# Patient Record
Sex: FEMALE | Race: WHITE | Employment: FULL TIME | URBAN - METROPOLITAN AREA
[De-identification: names, ages, dates, MRNs, and addresses within clinical notes are randomized per-mention and may not be internally consistent; named-entity substitution may affect disease eponyms.]

---

## 2021-05-18 ENCOUNTER — OFFICE VISIT (OUTPATIENT)
Dept: FAMILY MEDICINE CLINIC | Age: 41
End: 2021-05-18
Payer: COMMERCIAL

## 2021-05-18 VITALS
HEIGHT: 70 IN | OXYGEN SATURATION: 98 % | SYSTOLIC BLOOD PRESSURE: 118 MMHG | WEIGHT: 202 LBS | BODY MASS INDEX: 28.92 KG/M2 | HEART RATE: 80 BPM | DIASTOLIC BLOOD PRESSURE: 80 MMHG

## 2021-05-18 DIAGNOSIS — R63.5 WEIGHT GAIN: ICD-10-CM

## 2021-05-18 DIAGNOSIS — Z76.89 ENCOUNTER TO ESTABLISH CARE: Primary | ICD-10-CM

## 2021-05-18 DIAGNOSIS — F41.9 ANXIETY: ICD-10-CM

## 2021-05-18 LAB
A/G RATIO: 1.8 (ref 1.1–2.2)
ALBUMIN SERPL-MCNC: 4.3 G/DL (ref 3.4–5)
ALP BLD-CCNC: 40 U/L (ref 40–129)
ALT SERPL-CCNC: 11 U/L (ref 10–40)
ANION GAP SERPL CALCULATED.3IONS-SCNC: 8 MMOL/L (ref 3–16)
AST SERPL-CCNC: 15 U/L (ref 15–37)
BASOPHILS ABSOLUTE: 0 K/UL (ref 0–0.2)
BASOPHILS RELATIVE PERCENT: 0.6 %
BILIRUB SERPL-MCNC: 0.3 MG/DL (ref 0–1)
BUN BLDV-MCNC: 7 MG/DL (ref 7–20)
CALCIUM SERPL-MCNC: 9 MG/DL (ref 8.3–10.6)
CHLORIDE BLD-SCNC: 106 MMOL/L (ref 99–110)
CHOLESTEROL, TOTAL: 194 MG/DL (ref 0–199)
CO2: 25 MMOL/L (ref 21–32)
CREAT SERPL-MCNC: 0.6 MG/DL (ref 0.6–1.1)
EOSINOPHILS ABSOLUTE: 0.1 K/UL (ref 0–0.6)
EOSINOPHILS RELATIVE PERCENT: 1.6 %
GFR AFRICAN AMERICAN: >60
GFR NON-AFRICAN AMERICAN: >60
GLOBULIN: 2.4 G/DL
GLUCOSE BLD-MCNC: 77 MG/DL (ref 70–99)
HCT VFR BLD CALC: 38.8 % (ref 36–48)
HDLC SERPL-MCNC: 91 MG/DL (ref 40–60)
HEMOGLOBIN: 13 G/DL (ref 12–16)
LDL CHOLESTEROL CALCULATED: 89 MG/DL
LYMPHOCYTES ABSOLUTE: 1.3 K/UL (ref 1–5.1)
LYMPHOCYTES RELATIVE PERCENT: 27 %
MCH RBC QN AUTO: 32.6 PG (ref 26–34)
MCHC RBC AUTO-ENTMCNC: 33.4 G/DL (ref 31–36)
MCV RBC AUTO: 97.7 FL (ref 80–100)
MONOCYTES ABSOLUTE: 0.7 K/UL (ref 0–1.3)
MONOCYTES RELATIVE PERCENT: 15.7 %
NEUTROPHILS ABSOLUTE: 2.6 K/UL (ref 1.7–7.7)
NEUTROPHILS RELATIVE PERCENT: 55.1 %
PDW BLD-RTO: 12.8 % (ref 12.4–15.4)
PLATELET # BLD: 246 K/UL (ref 135–450)
PMV BLD AUTO: 9 FL (ref 5–10.5)
POTASSIUM SERPL-SCNC: 4.9 MMOL/L (ref 3.5–5.1)
RBC # BLD: 3.98 M/UL (ref 4–5.2)
SODIUM BLD-SCNC: 139 MMOL/L (ref 136–145)
TOTAL PROTEIN: 6.7 G/DL (ref 6.4–8.2)
TRIGL SERPL-MCNC: 69 MG/DL (ref 0–150)
TSH REFLEX FT4: 0.97 UIU/ML (ref 0.27–4.2)
VLDLC SERPL CALC-MCNC: 14 MG/DL
WBC # BLD: 4.7 K/UL (ref 4–11)

## 2021-05-18 PROCEDURE — 99204 OFFICE O/P NEW MOD 45 MIN: CPT | Performed by: NURSE PRACTITIONER

## 2021-05-18 RX ORDER — BUPROPION HYDROCHLORIDE 150 MG/1
150 TABLET ORAL EVERY MORNING
Qty: 30 TABLET | Refills: 3 | Status: SHIPPED | OUTPATIENT
Start: 2021-05-18 | End: 2022-01-06 | Stop reason: SDUPTHER

## 2021-05-18 ASSESSMENT — PATIENT HEALTH QUESTIONNAIRE - PHQ9: SUM OF ALL RESPONSES TO PHQ QUESTIONS 1-9: 0

## 2021-05-18 NOTE — ASSESSMENT & PLAN NOTE
Unclear control, lifestyle modifications recommended and We will follow-up with labs today, instructed patient to do a 2-week diet log for all caloric intake in addition to exercise logs. I suspect part of her issue may be she is not taking in enough protein, she is also having 12 to 15-hour fast in between eating.

## 2021-05-18 NOTE — ASSESSMENT & PLAN NOTE
Health maintenance reviewed and addressed.   Is not interested in Covid vaccine at this time Left voice message for patient stating orders were placed for physical therapy for the Left wrist. Message stated that a PSR will be calling to schedule that appointment and if she has any questions she should call the office at [phone].   Patient is post op ORIF Left Distal Radius, DOS: 5/10/17.

## 2021-05-18 NOTE — PROGRESS NOTES
Procedure Laterality Date    OVARY REMOVAL Right     WISDOM TOOTH EXTRACTION N/A        Social History     Socioeconomic History    Marital status: Single     Spouse name: Not on file    Number of children: Not on file    Years of education: Not on file    Highest education level: Not on file   Occupational History    Not on file   Tobacco Use    Smoking status: Former Smoker     Packs/day: 0.50     Types: Cigarettes     Start date: 2001     Quit date: 2007     Years since quittin.3    Smokeless tobacco: Never Used   Vaping Use    Vaping Use: Never assessed   Substance and Sexual Activity    Alcohol use: Not on file    Drug use: Not on file    Sexual activity: Yes     Partners: Male     Birth control/protection: I.U.D. Other Topics Concern    Not on file   Social History Narrative    [address]     Social Determinants of Health     Financial Resource Strain:     Difficulty of Paying Living Expenses:    Food Insecurity:     Worried About Running Out of Food in the Last Year:     [address] in the Last Year:    Transportation Needs:     Lack of Transportation (Medical):  Lack of Transportation (Non-Medical):    Physical Activity:     Days of Exercise per Week:     Minutes of Exercise per Session:    Stress:     Feeling of Stress :    Social Connections:     Frequency of Communication with Friends and Family:     Frequency of Social Gatherings with Friends and Family:     Attends Restoration Services:     Active Member of Clubs or Organizations:     Attends Club or Organization Meetings:     Marital Status:    Intimate Partner Violence:     Fear of Current or Ex-Partner:     Emotionally Abused:     Physically Abused:     Sexually Abused:         No family history on file.     Vitals:    21 1059   BP: 118/80   Pulse: 80   SpO2: 98%       Estimated body mass index is 28.98 kg/m² as calculated from the following:    Height as of this encounter: 5' 10\" (1.778 m). Weight as of this encounter: 202 lb (91.6 kg). Physical Exam  Vitals reviewed. Constitutional:       Appearance: She is well-developed. She is obese. HENT:      Head: Normocephalic and atraumatic. Right Ear: External ear normal.      Left Ear: External ear normal.      Nose: Nose normal.   Eyes:      General: No scleral icterus. Right eye: No discharge. Left eye: No discharge. Conjunctiva/sclera: Conjunctivae normal.      Pupils: Pupils are equal, round, and reactive to light. Neck:      Thyroid: No thyromegaly. Cardiovascular:      Rate and Rhythm: Normal rate and regular rhythm. Heart sounds: Normal heart sounds. No murmur heard. No friction rub. No gallop. Pulmonary:      Effort: Pulmonary effort is normal. No respiratory distress. Breath sounds: Normal breath sounds. No stridor. No wheezing or rales. Chest:      Chest wall: No tenderness. Abdominal:      General: Bowel sounds are normal. There is no distension. Palpations: Abdomen is soft. There is no mass. Tenderness: There is no abdominal tenderness. There is no guarding or rebound. Hernia: No hernia is present. Musculoskeletal:         General: No tenderness or deformity. Normal range of motion. Cervical back: Normal range of motion and neck supple. Lymphadenopathy:      Cervical: No cervical adenopathy. Skin:     General: Skin is warm and dry. Capillary Refill: Capillary refill takes less than 2 seconds. Coloration: Skin is not pale. Findings: No erythema or rash. Neurological:      Mental Status: She is alert and oriented to person, place, and time. Cranial Nerves: No cranial nerve deficit. Motor: No abnormal muscle tone. Coordination: Coordination normal.   Psychiatric:         Behavior: Behavior normal.         Thought Content:  Thought content normal.         Judgment: Judgment normal.         ASSESSMENT/PLAN:  Health Maintenance   Topic Date Due    COVID-19 Vaccine (1) Never done    DTaP/Tdap/Td vaccine (1 - Tdap) Never done    Lipid screen  Never done    Diabetes screen  Never done    Flu vaccine (Season Ended) 09/01/2021    Cervical cancer screen  05/18/2022    Hepatitis A vaccine  Aged Out    Hepatitis B vaccine  Aged Out    Hib vaccine  Aged Out    Meningococcal (ACWY) vaccine  Aged Out    Pneumococcal 0-64 years Vaccine  Aged Out    Varicella vaccine  Discontinued    Hepatitis C screen  Discontinued    HIV screen  Discontinued        Diagnosis Orders   1. Encounter to establish care     2. Weight gain  TSH WITH REFLEX TO FT4    COMPREHENSIVE METABOLIC PANEL    CBC WITH AUTO DIFFERENTIAL    LIPID PANEL    HEMOGLOBIN A1C   3. Anxiety  buPROPion (WELLBUTRIN XL) 150 MG extended release tablet     Anxiety  Restart Wellbutrin 150 XL. Follow-up with me in 1 month. Encounter to establish care  Health maintenance reviewed and addressed. Is not interested in Covid vaccine at this time    Weight gain   Unclear control, lifestyle modifications recommended and We will follow-up with labs today, instructed patient to do a 2-week diet log for all caloric intake in addition to exercise logs. I suspect part of her issue may be she is not taking in enough protein, she is also having 12 to 15-hour fast in between eating. No follow-ups on file. An  electronic signature was used to authenticate this note.   --Fausto Cruz, RAMAKRISHNA Santana CNP on 5/18/2021 at 12:15 PM

## 2021-05-19 LAB
ESTIMATED AVERAGE GLUCOSE: 93.9 MG/DL
HBA1C MFR BLD: 4.9 %

## 2021-06-09 ENCOUNTER — E-VISIT (OUTPATIENT)
Dept: FAMILY MEDICINE CLINIC | Age: 41
End: 2021-06-09
Payer: COMMERCIAL

## 2021-06-09 DIAGNOSIS — N30.00 ACUTE CYSTITIS WITHOUT HEMATURIA: Primary | ICD-10-CM

## 2021-06-09 PROCEDURE — 99422 OL DIG E/M SVC 11-20 MIN: CPT | Performed by: FAMILY MEDICINE

## 2021-06-10 RX ORDER — SULFAMETHOXAZOLE AND TRIMETHOPRIM 800; 160 MG/1; MG/1
1 TABLET ORAL 2 TIMES DAILY
Qty: 6 TABLET | Refills: 0 | Status: SHIPPED | OUTPATIENT
Start: 2021-06-10 | End: 2021-06-13

## 2021-06-10 NOTE — PROGRESS NOTES
The above-named patient has requested evaluation and management services through an electronic visit by way of [employer] powered by [employer] and provided by [facility]. The history of present illness provided by the patient as well as medications, allergies, past medical history have been reviewed in this electronic health record. Following evaluation and management recommendations have been made and communicated to the patient via Wander[employer]: Thank you for submitting an Evisit. I'm sorry you aren't feeling well. It sounds like you most likely have a urinary tract infection. I will send in a prescription for Bactrim DS to your pharmacy. Make sure you are drinking plenty of water and that you void after sexual intercourse. Taking a probiotic would also be helpful to prevent antibiotic-associated diarrhea. Contact your primary care physician's office if you do not see any improvement with the medication within the next 3 days. Please seek more urgent medical attention if you develop high fever, vomiting, or severe back/flank pain. Diagnoses and all orders for this visit:    Acute cystitis without hematuria  -     sulfamethoxazole-trimethoprim (BACTRIM DS) 800-160 MG per tablet; Take 1 tablet by mouth 2 times daily for 3 days        11-20 minutes were spent on the digital evaluation and management of this patient.

## 2022-01-05 ENCOUNTER — PATIENT MESSAGE (OUTPATIENT)
Dept: FAMILY MEDICINE CLINIC | Age: 42
End: 2022-01-05

## 2022-01-05 DIAGNOSIS — F41.9 ANXIETY: ICD-10-CM

## 2022-01-05 NOTE — TELEPHONE ENCOUNTER
From: [facility]  To: Dewane Lundborg  Sent: 1/5/2022 2:36 PM EST  Subject: Prescription refill    Hello. I am requesting a med refill for Bupropion. Thank you!   Felisha Guidry

## 2022-01-06 RX ORDER — BUPROPION HYDROCHLORIDE 150 MG/1
150 TABLET ORAL EVERY MORNING
Qty: 30 TABLET | Refills: 3 | Status: SHIPPED | OUTPATIENT
Start: 2022-01-06 | End: 2022-09-09 | Stop reason: SINTOL

## 2022-01-31 ENCOUNTER — OFFICE VISIT (OUTPATIENT)
Dept: ORTHOPEDIC SURGERY | Age: 42
End: 2022-01-31
Payer: COMMERCIAL

## 2022-01-31 VITALS — HEIGHT: 70 IN | WEIGHT: 191.3 LBS | BODY MASS INDEX: 27.39 KG/M2

## 2022-01-31 DIAGNOSIS — R07.81 RIB PAIN ON LEFT SIDE: ICD-10-CM

## 2022-01-31 DIAGNOSIS — S22.32XA CLOSED FRACTURE OF ONE RIB OF LEFT SIDE, INITIAL ENCOUNTER: Primary | ICD-10-CM

## 2022-01-31 DIAGNOSIS — R07.81 RIB PAIN ON LEFT SIDE: Primary | ICD-10-CM

## 2022-01-31 PROCEDURE — 99204 OFFICE O/P NEW MOD 45 MIN: CPT | Performed by: STUDENT IN AN ORGANIZED HEALTH CARE EDUCATION/TRAINING PROGRAM

## 2022-01-31 RX ORDER — HYDROCODONE BITARTRATE AND ACETAMINOPHEN 5; 325 MG/1; MG/1
1 TABLET ORAL 2 TIMES DAILY
Qty: 28 TABLET | Refills: 0 | Status: SHIPPED | OUTPATIENT
Start: 2022-01-31 | End: 2022-02-14

## 2022-01-31 NOTE — PROGRESS NOTES
CHIEF COMPLAINT: Left rib pain     DATE OF INJURY: 1/28/22    HISTORY:  Ms. Reece Cee is a 39 y.o. right handed female, who presents today for evaluation of a left rib injury. The patient reports that this injury occurred when when she fell skiing and landed with her arms above her head on her left side. She was first seen and evaluated in urgent care however Xray's were not taken. The patient denies any other injuries. She rates pain at a level of 8/10 on an analog scale. Movement makes the pain worse. Splint and resting makes the pain better. No numbness or tingling sensation. 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 Past Medical History:   Diagnosis Date    Anxiety associated with depression     Attention deficit hyperactivity disorder (ADHD), combined type     Thoracogenic scoliosis of thoracic region        Past Surgical History:   Procedure Laterality Date    OVARY REMOVAL Right     WISDOM TOOTH EXTRACTION N/A        Current Outpatient Medications   Medication Sig Dispense Refill    HYDROcodone-acetaminophen (NORCO) 5-325 MG per tablet Take 1 tablet by mouth 2 times daily for 14 days. 28 tablet 0    buPROPion (WELLBUTRIN XL) 150 MG extended release tablet Take 1 tablet by mouth every morning 30 tablet 3     No current facility-administered medications for this visit.        No Known Allergies    Social History     Socioeconomic History    Marital status: Single     Spouse name: Not on file    Number of children: Not on file    Years of education: Not on file    Highest education level: Not on file   Occupational History    Not on file   Tobacco Use    Smoking status: Former Smoker     Packs/day: 0.50     Types: Cigarettes     Start date: 1/1/2001     Quit date: 1/1/2007     Years since quitting: 15.0    Smokeless tobacco: Never Used   Vaping Use    Vaping Use: Not on file   Substance and Sexual Activity    Alcohol use: Not on file    Drug use: Not on file    Sexual activity: Yes     Partners: Male     Birth control/protection: I.U.D. Other Topics Concern    Not on file   Social History Narrative    [address]     Social Determinants of Health     Financial Resource Strain:     Difficulty of Paying Living Expenses: Not on file   Food Insecurity:     Worried About [address] in the Last Year: Not on file    An of Food in the Last Year: Not on file   Transportation Needs:     Lack of Transportation (Medical): Not on file    Lack of Transportation (Non-Medical): Not on file   Physical Activity:     Days of Exercise per Week: Not on file    Minutes of Exercise per Session: Not on file   Stress:     Feeling of Stress : Not on file   Social Connections:     Frequency of Communication with Friends and Family: Not on file    Frequency of Social Gatherings with Friends and Family: Not on file    Attends Taoist Services: Not on file    Active Member of [address] [place] [address] or Organizations: Not on file    Attends Club or Organization Meetings: Not on file    Marital Status: Not on file   Intimate Partner Violence:     Fear of Current or Ex-Partner: Not on file    Emotionally Abused: Not on file    Physically Abused: Not on file    Sexually Abused: Not on file   Housing Stability:     Unable to Pay for Housing in the Last Year: Not on file    Number of [place] in the Last Year: Not on file    Unstable Housing in the Last Year: Not on file       History reviewed. No pertinent family history. Review of Systems:   I have reviewed the clinically relevant past medical history, medications, allergies, family history, social history, and 13 point Review of Systems from the patient's recent history form & documented any details relevant to today's presenting complaints in the history above.  The patient's self-reported past medical history, medications, allergies, family history, social history, and Review of Systems form from 1/31/22 have been scanned into the chart under the \"Media\" tab. PHYSICAL EXAM:  Ms. Lisa Kendrick is a 39 y.o. female who presents today in no acute distress, awake, alert, and oriented. On evaluation of her left trunk and abdomen  there is no obvious deformity. There is no swelling and is no ecchymosis. She is tender to palpation over the 8th rib anterior laterally, and otherwise nontender over the remainder of the trunk or abdomen. Range of motion with reaching overhead left upper extremity is decreased secondary to pain over the left anterior rib cage. She has pain with deep breathing, coughing, bearing down, laughing, pushing and pulling. She is having trouble sleeping due to pain. The skin overlying the left truncal region is intact without evidence of lesion, laceration or abrasion. Distal pulses are 2+ and symmetric bilaterally. Sensation is grossly intact to light touch and symmetric bilaterally. Denies shortness of breath or flank pain. Left rib including chest Xrays 3 views taken today in the office: Possible fracture of lateral 8th rib. IMPRESSION:    Left rib fracture       PLAN:  Discussed natural history and answered questions. We discussed conservative treatment. Restrict pushing, pulling, carrying and lifting with left upper extremity. She does not need a work note. Norco 5-325 BID for 14 days sent to pharmacy. Low risk ORT and prescription monitoring reviewed. Follow up in 6 weeks if no improvement or sooner if worsening pain. Julissa Briggs PA-C  Orthopaedic Surgery and Sports Medicine     Disclaimer: This note was generated with use of a verbal recognition program and an attempt was made to check for errors. It is possible that there are still dictated errors within this office note. If so, please bring any significant errors to my attention for an addendum. All efforts were made to ensure that this office note is accurate.

## 2022-02-10 RX ORDER — GABAPENTIN 100 MG/1
100 CAPSULE ORAL 3 TIMES DAILY
Qty: 90 CAPSULE | Refills: 1 | Status: ON HOLD | OUTPATIENT
Start: 2022-02-10 | End: 2022-07-31 | Stop reason: HOSPADM

## 2022-02-10 RX ORDER — CYCLOBENZAPRINE HCL 10 MG
10 TABLET ORAL 3 TIMES DAILY PRN
Qty: 80 TABLET | Refills: 0 | Status: SHIPPED | OUTPATIENT
Start: 2022-02-10 | End: 2022-03-09

## 2022-07-30 ENCOUNTER — HOSPITAL ENCOUNTER (INPATIENT)
Age: 42
LOS: 1 days | Discharge: HOME | DRG: 310 | End: 2022-07-31
Attending: EMERGENCY MEDICINE | Admitting: INTERNAL MEDICINE
Payer: COMMERCIAL

## 2022-07-30 ENCOUNTER — APPOINTMENT (OUTPATIENT)
Dept: GENERAL RADIOLOGY | Age: 42
DRG: 310 | End: 2022-07-30
Payer: COMMERCIAL

## 2022-07-30 ENCOUNTER — APPOINTMENT (OUTPATIENT)
Dept: CT IMAGING | Age: 42
DRG: 310 | End: 2022-07-30
Payer: COMMERCIAL

## 2022-07-30 DIAGNOSIS — E83.42 HYPOMAGNESEMIA: ICD-10-CM

## 2022-07-30 DIAGNOSIS — I48.91 ATRIAL FIBRILLATION WITH RAPID VENTRICULAR RESPONSE (HCC): Primary | ICD-10-CM

## 2022-07-30 LAB
A/G RATIO: 1.4 (ref 1.1–2.2)
ALBUMIN SERPL-MCNC: 4.2 G/DL (ref 3.4–5)
ALP BLD-CCNC: 57 U/L (ref 40–129)
ALT SERPL-CCNC: 12 U/L (ref 10–40)
ANION GAP SERPL CALCULATED.3IONS-SCNC: 10 MMOL/L (ref 3–16)
APTT: 27.6 SEC (ref 23–34.3)
AST SERPL-CCNC: 16 U/L (ref 15–37)
BASOPHILS ABSOLUTE: 0.2 K/UL (ref 0–0.2)
BASOPHILS RELATIVE PERCENT: 1.9 %
BILIRUB SERPL-MCNC: <0.2 MG/DL (ref 0–1)
BUN BLDV-MCNC: 7 MG/DL (ref 7–20)
CALCIUM SERPL-MCNC: 9.4 MG/DL (ref 8.3–10.6)
CHLORIDE BLD-SCNC: 104 MMOL/L (ref 99–110)
CO2: 26 MMOL/L (ref 21–32)
CREAT SERPL-MCNC: 0.6 MG/DL (ref 0.6–1.1)
D DIMER: 1.88 UG/ML FEU (ref 0–0.6)
EOSINOPHILS ABSOLUTE: 0.2 K/UL (ref 0–0.6)
EOSINOPHILS RELATIVE PERCENT: 2.2 %
GFR AFRICAN AMERICAN: >60
GFR NON-AFRICAN AMERICAN: >60
GLUCOSE BLD-MCNC: 111 MG/DL (ref 70–99)
HCG QUALITATIVE: NEGATIVE
HCT VFR BLD CALC: 40 % (ref 36–48)
HEMOGLOBIN: 13.4 G/DL (ref 12–16)
INR BLD: 1 (ref 0.87–1.14)
LYMPHOCYTES ABSOLUTE: 2.7 K/UL (ref 1–5.1)
LYMPHOCYTES RELATIVE PERCENT: 34 %
MAGNESIUM: 1.6 MG/DL (ref 1.8–2.4)
MCH RBC QN AUTO: 32 PG (ref 26–34)
MCHC RBC AUTO-ENTMCNC: 33.4 G/DL (ref 31–36)
MCV RBC AUTO: 95.6 FL (ref 80–100)
MONOCYTES ABSOLUTE: 0.7 K/UL (ref 0–1.3)
MONOCYTES RELATIVE PERCENT: 8.7 %
NEUTROPHILS ABSOLUTE: 4.1 K/UL (ref 1.7–7.7)
NEUTROPHILS RELATIVE PERCENT: 53.2 %
PDW BLD-RTO: 11.9 % (ref 12.4–15.4)
PLATELET # BLD: 335 K/UL (ref 135–450)
PMV BLD AUTO: 8.4 FL (ref 5–10.5)
POTASSIUM SERPL-SCNC: 3.9 MMOL/L (ref 3.5–5.1)
PRO-BNP: 131 PG/ML (ref 0–124)
PROTHROMBIN TIME: 13.1 SEC (ref 11.7–14.5)
RBC # BLD: 4.19 M/UL (ref 4–5.2)
SODIUM BLD-SCNC: 140 MMOL/L (ref 136–145)
TOTAL PROTEIN: 7.1 G/DL (ref 6.4–8.2)
TROPONIN: <0.01 NG/ML
TSH REFLEX: 0.77 UIU/ML (ref 0.27–4.2)
WBC # BLD: 7.8 K/UL (ref 4–11)

## 2022-07-30 PROCEDURE — 84703 CHORIONIC GONADOTROPIN ASSAY: CPT

## 2022-07-30 PROCEDURE — 96372 THER/PROPH/DIAG INJ SC/IM: CPT

## 2022-07-30 PROCEDURE — 80053 COMPREHEN METABOLIC PANEL: CPT

## 2022-07-30 PROCEDURE — 96374 THER/PROPH/DIAG INJ IV PUSH: CPT

## 2022-07-30 PROCEDURE — 6360000002 HC RX W HCPCS: Performed by: EMERGENCY MEDICINE

## 2022-07-30 PROCEDURE — 83735 ASSAY OF MAGNESIUM: CPT

## 2022-07-30 PROCEDURE — 6360000004 HC RX CONTRAST MEDICATION: Performed by: EMERGENCY MEDICINE

## 2022-07-30 PROCEDURE — 71046 X-RAY EXAM CHEST 2 VIEWS: CPT

## 2022-07-30 PROCEDURE — 1200000000 HC SEMI PRIVATE

## 2022-07-30 PROCEDURE — 85610 PROTHROMBIN TIME: CPT

## 2022-07-30 PROCEDURE — 2500000003 HC RX 250 WO HCPCS

## 2022-07-30 PROCEDURE — 85730 THROMBOPLASTIN TIME PARTIAL: CPT

## 2022-07-30 PROCEDURE — 71260 CT THORAX DX C+: CPT | Performed by: EMERGENCY MEDICINE

## 2022-07-30 PROCEDURE — 2580000003 HC RX 258: Performed by: INTERNAL MEDICINE

## 2022-07-30 PROCEDURE — 93005 ELECTROCARDIOGRAM TRACING: CPT | Performed by: EMERGENCY MEDICINE

## 2022-07-30 PROCEDURE — 99285 EMERGENCY DEPT VISIT HI MDM: CPT

## 2022-07-30 PROCEDURE — 85025 COMPLETE CBC W/AUTO DIFF WBC: CPT

## 2022-07-30 PROCEDURE — 83880 ASSAY OF NATRIURETIC PEPTIDE: CPT

## 2022-07-30 PROCEDURE — 6370000000 HC RX 637 (ALT 250 FOR IP): Performed by: INTERNAL MEDICINE

## 2022-07-30 PROCEDURE — 85379 FIBRIN DEGRADATION QUANT: CPT

## 2022-07-30 PROCEDURE — 6360000002 HC RX W HCPCS: Performed by: INTERNAL MEDICINE

## 2022-07-30 PROCEDURE — 84443 ASSAY THYROID STIM HORMONE: CPT

## 2022-07-30 PROCEDURE — 84484 ASSAY OF TROPONIN QUANT: CPT

## 2022-07-30 PROCEDURE — 2500000003 HC RX 250 WO HCPCS: Performed by: EMERGENCY MEDICINE

## 2022-07-30 RX ORDER — BUPROPION HYDROCHLORIDE 150 MG/1
150 TABLET ORAL EVERY MORNING
Status: DISCONTINUED | OUTPATIENT
Start: 2022-07-31 | End: 2022-07-31 | Stop reason: HOSPADM

## 2022-07-30 RX ORDER — ONDANSETRON 4 MG/1
4 TABLET, ORALLY DISINTEGRATING ORAL EVERY 8 HOURS PRN
Status: DISCONTINUED | OUTPATIENT
Start: 2022-07-30 | End: 2022-07-31 | Stop reason: HOSPADM

## 2022-07-30 RX ORDER — DILTIAZEM HYDROCHLORIDE 5 MG/ML
20 INJECTION INTRAVENOUS ONCE
Status: COMPLETED | OUTPATIENT
Start: 2022-07-30 | End: 2022-07-30

## 2022-07-30 RX ORDER — ONDANSETRON 2 MG/ML
4 INJECTION INTRAMUSCULAR; INTRAVENOUS EVERY 6 HOURS PRN
Status: DISCONTINUED | OUTPATIENT
Start: 2022-07-30 | End: 2022-07-31 | Stop reason: HOSPADM

## 2022-07-30 RX ORDER — ACETAMINOPHEN 325 MG/1
650 TABLET ORAL EVERY 6 HOURS PRN
Status: DISCONTINUED | OUTPATIENT
Start: 2022-07-30 | End: 2022-07-31 | Stop reason: HOSPADM

## 2022-07-30 RX ORDER — MAGNESIUM SULFATE IN WATER 40 MG/ML
2000 INJECTION, SOLUTION INTRAVENOUS ONCE
Status: COMPLETED | OUTPATIENT
Start: 2022-07-30 | End: 2022-07-30

## 2022-07-30 RX ORDER — ENOXAPARIN SODIUM 100 MG/ML
40 INJECTION SUBCUTANEOUS DAILY
Status: DISCONTINUED | OUTPATIENT
Start: 2022-07-30 | End: 2022-07-31

## 2022-07-30 RX ORDER — GABAPENTIN 100 MG/1
100 CAPSULE ORAL 3 TIMES DAILY
Status: DISCONTINUED | OUTPATIENT
Start: 2022-07-30 | End: 2022-07-30

## 2022-07-30 RX ORDER — SODIUM CHLORIDE 0.9 % (FLUSH) 0.9 %
5-40 SYRINGE (ML) INJECTION EVERY 12 HOURS SCHEDULED
Status: DISCONTINUED | OUTPATIENT
Start: 2022-07-30 | End: 2022-07-31 | Stop reason: HOSPADM

## 2022-07-30 RX ORDER — SODIUM CHLORIDE 9 MG/ML
INJECTION, SOLUTION INTRAVENOUS PRN
Status: DISCONTINUED | OUTPATIENT
Start: 2022-07-30 | End: 2022-07-31 | Stop reason: HOSPADM

## 2022-07-30 RX ORDER — DILTIAZEM HYDROCHLORIDE 5 MG/ML
INJECTION INTRAVENOUS
Status: COMPLETED
Start: 2022-07-30 | End: 2022-07-30

## 2022-07-30 RX ORDER — SODIUM CHLORIDE 0.9 % (FLUSH) 0.9 %
5-40 SYRINGE (ML) INJECTION PRN
Status: DISCONTINUED | OUTPATIENT
Start: 2022-07-30 | End: 2022-07-31 | Stop reason: HOSPADM

## 2022-07-30 RX ORDER — POLYETHYLENE GLYCOL 3350 17 G/17G
17 POWDER, FOR SOLUTION ORAL DAILY PRN
Status: DISCONTINUED | OUTPATIENT
Start: 2022-07-30 | End: 2022-07-31 | Stop reason: HOSPADM

## 2022-07-30 RX ORDER — ACETAMINOPHEN 650 MG/1
650 SUPPOSITORY RECTAL EVERY 6 HOURS PRN
Status: DISCONTINUED | OUTPATIENT
Start: 2022-07-30 | End: 2022-07-31 | Stop reason: HOSPADM

## 2022-07-30 RX ADMIN — MAGNESIUM SULFATE HEPTAHYDRATE 2000 MG: 40 INJECTION, SOLUTION INTRAVENOUS at 18:11

## 2022-07-30 RX ADMIN — DILTIAZEM HYDROCHLORIDE 20 MG: 5 INJECTION INTRAVENOUS at 09:32

## 2022-07-30 RX ADMIN — ENOXAPARIN SODIUM 40 MG: 100 INJECTION SUBCUTANEOUS at 18:28

## 2022-07-30 RX ADMIN — DILTIAZEM HYDROCHLORIDE 30 MG: 30 TABLET, FILM COATED ORAL at 17:22

## 2022-07-30 RX ADMIN — IOPAMIDOL 75 ML: 755 INJECTION, SOLUTION INTRAVENOUS at 11:15

## 2022-07-30 RX ADMIN — DEXTROSE MONOHYDRATE 5 MG/HR: 50 INJECTION, SOLUTION INTRAVENOUS at 10:06

## 2022-07-30 RX ADMIN — SODIUM CHLORIDE, PRESERVATIVE FREE 10 ML: 5 INJECTION INTRAVENOUS at 21:26

## 2022-07-30 RX ADMIN — MAGNESIUM SULFATE HEPTAHYDRATE 2000 MG: 40 INJECTION, SOLUTION INTRAVENOUS at 10:32

## 2022-07-30 NOTE — H&P
Hospital Medicine History & Physical      PCP: RAMAKRISHNA Kim CNP    Date of Admission: 7/30/2022    Date of Service: Pt seen/examined on 7/30/2022    Chief Complaint:      Chief Complaint   Patient presents with    Tachycardia       History Of Present Illness:     80-year-old female with past medical history of depression presented to the hospital with palpitation going on for the past couple of days. Patient states that her heart has been beating fast for the past couple of days, it started without any inciting factors. She had such an episode couple weeks ago but it lasted only a few minutes. But for the past couple of days she feels like her heart has been racing nonstop. This has been accompanied with some exertional shortness of breath as well as dizziness. Since her symptoms were getting better she decided to come to the hospital.  On arrival she was noted to be in atrial fibrillation with rvr. Lab work showed mild hypomagnesemia 1.6. Patient had a CT PA performed which was negative for acute pulmonary embolism. Patient was admitted for further work-up and management. Past Medical History:        Diagnosis Date    Anxiety associated with depression     Attention deficit hyperactivity disorder (ADHD), combined type     Thoracogenic scoliosis of thoracic region        Past Surgical History:        Procedure Laterality Date    OVARY REMOVAL Right     WISDOM TOOTH EXTRACTION N/A        Medications Prior to Admission:    Prior to Admission medications    Medication Sig Start Date End Date Taking? Authorizing Provider   gabapentin (NEURONTIN) 100 MG capsule Take 1 capsule by mouth 3 times daily for 60 days. 2/10/22 4/11/22  Leslie Starks MD   buPROPion (WELLBUTRIN XL) 150 MG extended release tablet Take 1 tablet by mouth every morning 1/6/22   RAMAKRISHNA Llanes CNP       Allergies:  Patient has no known allergies.     Social History:       reports that she quit smoking about 140   K 3.9      CO2 26   BUN 7   CREATININE 0.6     LFT'S  Recent Labs     07/30/22  0924   AST 16   ALT 12   BILITOT <0.2   ALKPHOS 57     COAG  Recent Labs     07/30/22  0924   INR 1.00     CARDIAC ENZYMES  Recent Labs     07/30/22  0924   TROPONINI <0.01       U/A:  No results found for: NITRITE, COLORU, WBCUA, RBCUA, MUCUS, BACTERIA, CLARITYU, SPECGRAV, LEUKOCYTESUR, BLOODU, GLUCOSEU, AMORPHOUS    ABG  No results found for: TXK1IXS, BEART, Q1YCCCBM, PHART, THGBART, NXF2VZY, PO2ART, WIQ2NRW    UA:No results for input(s): NITRITE, COLORU, PHUR, LABCAST, WBCUA, RBCUA, MUCUS, TRICHOMONAS, YEAST, BACTERIA, CLARITYU, SPECGRAV, LEUKOCYTESUR, UROBILINOGEN, BILIRUBINUR, BLOODU, GLUCOSEU, KETUA, AMORPHOUS in the last 72 hours. Microbiology:  No results for input(s): LABGRAM, LABANAE, ORG, CXSURG in the last 72 hours. Nasal Culture: No results for input(s): ORG, MRSAPCR in the last 72 hours. Blood Culture: No results for input(s): BC, BLOODCULT2, ORG in the last 72 hours. Fungal Culture:   No results for input(s): FUNGSM in the last 72 hours. No results for input(s): FUNCXBLD in the last 72 hours. CSF Culture:  No results for input(s): COLORCSF, APPEARCSF, CFTUBE, CLOTCSF, WBCCSF, RBCCSF, NEUTCSF, NUMCELLSCSF, LYMPHSCSF, MONOCSF, GLUCCSF, VOLCSF in the last 72 hours. Respiratory Culture:  No results for input(s): Orpha Seeds in the last 72 hours. AFB:No results for input(s): AFBSMEAR in the last 72 hours. Urine Culture  No results for input(s): LABURIN in the last 72 hours. RADIOLOGY:    CT CHEST PULMONARY EMBOLISM W CONTRAST   Final Result      No evidence of pulmonary embolism or acute intrathoracic abnormality. Nonspecific small pulmonary nodules, likely benign. Following the Fleischner Society 2017 guidelines for multiple solid nodules <6 mm, if patient is low risk no routine follow-up is suggested. If patient is high risk, then optional CT at 12 months is    suggested.   qzxv Nonspecific asymmetric soft tissue density in the medial left breast, potentially normal fibroglandular tissue. Recommend correlation with mammography. INCIDENTAL FINDINGS: None. XR CHEST (2 VW)   Final Result      No acute radiographic abnormality of the chest.          Previous medical records personally reviewed and analyzed         PHYSICIAN CERTIFICATION    I certify that Rashaun Estrada is expected to be hospitalized for >2 midnights based on the following assessment and plan:    ASSESSMENT/PLAN:  There are no active hospital problems to display for this patient. Atrial fibrillation with RVR  -Patient endorses taking supplement called Hydroxycut which contains caffeine  - Continue IV Cardizem, will transition to oral Cardizem  - Cardiology consult  - Telemetry  - Keep K greater than 4, magnesium greater than 2  - Echo has been ordered    Depression  - Continue Wellbutrin    DVT Prophylaxis: lovenox  Diet: ADULT DIET; Regular  Code Status: Full Code    Dispo - pending clinical course       Josefa Easley MD  The note was completed using EMR. Every effort was made to ensure accuracy; however, inadvertent computerized transcription errors may be present. Thank you RAMAKRISHNA Palencia - JOE for the opportunity to be involved in this patient's care. If you have any questions or concerns please feel free to contact me at [phone].

## 2022-07-30 NOTE — PROGRESS NOTES
Just gave oral cardizem, waiting 30 minutes per Cardiology instructions to shut off drip. Mag sulfate and Dilt drip not compatible; 1 IV.  Waiting 30 minutes for mag replacement IVPB

## 2022-07-30 NOTE — PLAN OF CARE
Problem: Cardiovascular - Adult  Goal: Absence of cardiac dysrhythmias or at baseline  Outcome: Not Progressing     Problem: Cardiovascular - Adult  Goal: Maintains optimal cardiac output and hemodynamic stability  Outcome: Progressing     Pt remains in A-fib

## 2022-07-30 NOTE — ED PROVIDER NOTES
This patient was seen by Resident physician Dr. Rosanna Camilo NOTE  There was a high probability of clinical significant / life threatening deterioration in the patient's condition which required my urgent intervention. Total critical care time was at least 31  minutes excluding any separately reported procedures. I personally saw the patient and performed a substantive portion of the visit including all aspects of the medical decision making. History, physical exam, diagnostic tests, management/treatment, response to management/treatment, differential diagnosis and plan including follow-up were reviewed. EKG read by myself shows atrial fibrillation with rapid ventricular response and average rate of 134. Axis is 82. There is no ischemia. QTC is 400. CBC, CMP and troponin were normal.  D-dimer was elevated at 1.88.  proBNP was 131. Magnesium was low at 1.6. The patient was treated with Cardizem bolus and infusion with slowing of the rate down to approximately 100. She feels better. No chest pain or shortness of breath. IV magnesium was ordered. CTPA study read by the radiologist and reviewed by myself shows no evidence for PE or any acute intrathoracic abnormality. Discussed case with the hospitalist on duty, Dr. Betsy Wolf who [facility] [address] We will continue Cardizem infusion.       DX: 1) new onset atrial fibrillation with RVR         2) hypomagnesemia     Alli Dye MD  07/30/22 [id number]

## 2022-07-30 NOTE — ED PROVIDER NOTES
Emergency Physician Note    Chief Complaint  Tachycardia       History of Present Illness  Robert Drake is a 39 y.o. female who presents to the ED for palpitations that started shortly after waking today. Notes she had a similar episode 1 week ago, and two days ago but these quickly resolved without intervention. However, today PT  has palpitations, fast heart rate, shortness of breath and some light headedness. She was preparing to drive to [place], but when she got in her car, did not feel safe to drive so came to the ED instead. +FH (paternal MI at 47)      Denies nausea, personal history of cardiac issues, recent travel, calf pain or swelling     10 systems reviewed, pertinent positives per HPI otherwise noted to be negative      Nursing notes reviewed. ED Triage Vitals [07/30/22 0918]   Enc Vitals Group      BP (!) 146/58      Heart Rate (!) 124      Resp 14      Temp 97.8 °F (36.6 °C)      Temp Source Oral      SpO2 100 %      Weight 179 lb 12.8 oz (81.6 kg)      Height 5' 10\" (1.778 m)      Head Circumference       Peak Flow       Pain Score       Pain Loc       Pain Edu? Excl. in [address]? GENERAL:  Awake, alert. Well developed, well nourished with no apparent distress. HENT:  Normocephalic, Atraumatic, moist mucous membranes. EYES:  Pupils equal round and reactive to light, Conjunctiva normal, extraocular movements normal.  NECK:  No meningeal signs, Supple. CHEST:  Irregularly irregular with tachycardia, no extra heart sounds   LUNGS:  Clear to auscultation bilaterally. ABDOMEN:  Soft, non-tender, no rebound, rigidity or guarding, non-distended, normal bowel sounds. No costovertebral angle tenderness to palpation. BACK:  No tenderness. EXTREMITIES:  Normal range of motion, no edema, no bony tenderness, no deformity, distal pulses present. SKIN: Warm, dry and intact. NEUROLOGIC: Normal mental status. Moving all extremities to command.        LABS and DIAGNOSTIC RESULTS  EKG  The Ekg interpreted by me shows  Atrial fibrillation with RVR. Rate 134  Axis is   Normal  QTc is  normal  Intervals and Durations are unremarkable. ST Segments: nonspecific changes  Delta waves, Brugada Syndrome, and Short OR are not present. RADIOLOGY  X-RAYS:  I have reviewed radiologic plain film image(s). ALL OTHER NON-PLAIN FILM IMAGES SUCH AS CT, ULTRASOUND AND MRI HAVE BEEN READ BY THE RADIOLOGIST. CT CHEST PULMONARY EMBOLISM W CONTRAST   Final Result      No evidence of pulmonary embolism or acute intrathoracic abnormality. Nonspecific small pulmonary nodules, likely benign. Following the Fleischner Society 2017 guidelines for multiple solid nodules <6 mm, if patient is low risk no routine follow-up is suggested. If patient is high risk, then optional CT at 12 months is    suggested. qzxv      Nonspecific asymmetric soft tissue density in the medial left breast, potentially normal fibroglandular tissue. Recommend correlation with mammography. INCIDENTAL FINDINGS: None.       XR CHEST (2 VW)   Final Result      No acute radiographic abnormality of the chest.           LABS  Labs Reviewed   CBC WITH AUTO DIFFERENTIAL - Abnormal; Notable for the following components:       Result Value    RDW 11.9 (*)     All other components within normal limits   COMPREHENSIVE METABOLIC PANEL - Abnormal; Notable for the following components:    Glucose 111 (*)     All other components within normal limits   D-DIMER, QUANTITATIVE - Abnormal; Notable for the following components:    D-Dimer, Quant 1.88 (*)     All other components within normal limits   BRAIN NATRIURETIC PEPTIDE - Abnormal; Notable for the following components:    Pro- (*)     All other components within normal limits   MAGNESIUM - Abnormal; Notable for the following components:    Magnesium 1.60 (*)     All other components within normal limits   TROPONIN   PROTIME-INR   APTT   HCG, SERUM, QUALITATIVE   TSH WITH REFLEX       PROCEDURES      MEDICAL DECISION MAKING          The total Critical Care time is 31 minutes which excludes separately billable procedures. The critical care was concerning A Fib with RVR medical management and consult with Hospitalist on duty at [facility] where PT was admitted to Med/Surg with Tele for further observation . This time is exclusive of any time documented by any other providers. Patient was given scripts for the following medications. I counseled patient how to take these medications. New Prescriptions    No medications on file       Disposition  Pt is in stable condition upon Admit to med/surg floor with Telemetry. Clinical Impression  1. Atrial fibrillation with rapid ventricular response ([address]    2.  Hypomagnesemia        Dr. Dre Rucker, PGY3    Patient seen in collaboration with attending physician Dr. Janeen Morrow, DO  Resident  07/30/22 [phone]

## 2022-07-31 VITALS
HEIGHT: 70 IN | BODY MASS INDEX: 25 KG/M2 | SYSTOLIC BLOOD PRESSURE: 127 MMHG | WEIGHT: 174.6 LBS | OXYGEN SATURATION: 99 % | TEMPERATURE: 98.1 F | HEART RATE: 90 BPM | DIASTOLIC BLOOD PRESSURE: 71 MMHG | RESPIRATION RATE: 18 BRPM

## 2022-07-31 PROBLEM — I48.91 ATRIAL FIBRILLATION WITH RVR (HCC): Status: ACTIVE | Noted: 2022-07-31

## 2022-07-31 LAB
ANION GAP SERPL CALCULATED.3IONS-SCNC: 8 MMOL/L (ref 3–16)
BASOPHILS ABSOLUTE: 0 K/UL (ref 0–0.2)
BASOPHILS RELATIVE PERCENT: 0.4 %
BUN BLDV-MCNC: 8 MG/DL (ref 7–20)
CALCIUM SERPL-MCNC: 8.6 MG/DL (ref 8.3–10.6)
CHLORIDE BLD-SCNC: 103 MMOL/L (ref 99–110)
CO2: 26 MMOL/L (ref 21–32)
CREAT SERPL-MCNC: 0.7 MG/DL (ref 0.6–1.1)
EKG ATRIAL RATE: 170 BPM
EKG DIAGNOSIS: NORMAL
EKG DIAGNOSIS: NORMAL
EKG P AXIS: 225 DEGREES
EKG P-R INTERVAL: 120 MS
EKG Q-T INTERVAL: 268 MS
EKG Q-T INTERVAL: 316 MS
EKG QRS DURATION: 86 MS
EKG QRS DURATION: 88 MS
EKG QTC CALCULATION (BAZETT): 400 MS
EKG QTC CALCULATION (BAZETT): 437 MS
EKG R AXIS: 81 DEGREES
EKG R AXIS: 82 DEGREES
EKG T AXIS: 13 DEGREES
EKG T AXIS: 53 DEGREES
EKG VENTRICULAR RATE: 115 BPM
EKG VENTRICULAR RATE: 134 BPM
EOSINOPHILS ABSOLUTE: 0.1 K/UL (ref 0–0.6)
EOSINOPHILS RELATIVE PERCENT: 2.8 %
GFR AFRICAN AMERICAN: >60
GFR NON-AFRICAN AMERICAN: >60
GLUCOSE BLD-MCNC: 88 MG/DL (ref 70–99)
HCT VFR BLD CALC: 39.7 % (ref 36–48)
HEMOGLOBIN: 13 G/DL (ref 12–16)
LYMPHOCYTES ABSOLUTE: 1.9 K/UL (ref 1–5.1)
LYMPHOCYTES RELATIVE PERCENT: 38.9 %
MCH RBC QN AUTO: 31.8 PG (ref 26–34)
MCHC RBC AUTO-ENTMCNC: 32.8 G/DL (ref 31–36)
MCV RBC AUTO: 97.1 FL (ref 80–100)
MONOCYTES ABSOLUTE: 0.5 K/UL (ref 0–1.3)
MONOCYTES RELATIVE PERCENT: 10.4 %
NEUTROPHILS ABSOLUTE: 2.3 K/UL (ref 1.7–7.7)
NEUTROPHILS RELATIVE PERCENT: 47.5 %
PDW BLD-RTO: 12.4 % (ref 12.4–15.4)
PLATELET # BLD: 289 K/UL (ref 135–450)
PMV BLD AUTO: 8.3 FL (ref 5–10.5)
POTASSIUM REFLEX MAGNESIUM: 4 MMOL/L (ref 3.5–5.1)
RBC # BLD: 4.09 M/UL (ref 4–5.2)
SODIUM BLD-SCNC: 137 MMOL/L (ref 136–145)
WBC # BLD: 4.9 K/UL (ref 4–11)

## 2022-07-31 PROCEDURE — 6360000002 HC RX W HCPCS: Performed by: INTERNAL MEDICINE

## 2022-07-31 PROCEDURE — 80048 BASIC METABOLIC PNL TOTAL CA: CPT

## 2022-07-31 PROCEDURE — 6370000000 HC RX 637 (ALT 250 FOR IP): Performed by: INTERNAL MEDICINE

## 2022-07-31 PROCEDURE — 85025 COMPLETE CBC W/AUTO DIFF WBC: CPT

## 2022-07-31 PROCEDURE — 99222 1ST HOSP IP/OBS MODERATE 55: CPT | Performed by: INTERNAL MEDICINE

## 2022-07-31 PROCEDURE — 2580000003 HC RX 258: Performed by: INTERNAL MEDICINE

## 2022-07-31 PROCEDURE — 93010 ELECTROCARDIOGRAM REPORT: CPT | Performed by: INTERNAL MEDICINE

## 2022-07-31 PROCEDURE — 36415 COLL VENOUS BLD VENIPUNCTURE: CPT

## 2022-07-31 RX ORDER — DILTIAZEM HYDROCHLORIDE 120 MG/1
120 CAPSULE, COATED, EXTENDED RELEASE ORAL DAILY
Status: DISCONTINUED | OUTPATIENT
Start: 2022-07-31 | End: 2022-07-31 | Stop reason: HOSPADM

## 2022-07-31 RX ORDER — DILTIAZEM HYDROCHLORIDE 120 MG/1
120 CAPSULE, COATED, EXTENDED RELEASE ORAL DAILY
Qty: 30 CAPSULE | Refills: 3 | Status: SHIPPED | OUTPATIENT
Start: 2022-07-31

## 2022-07-31 RX ADMIN — RIVAROXABAN 20 MG: 20 TABLET, FILM COATED ORAL at 15:43

## 2022-07-31 RX ADMIN — SODIUM CHLORIDE, PRESERVATIVE FREE 10 ML: 5 INJECTION INTRAVENOUS at 08:00

## 2022-07-31 RX ADMIN — DILTIAZEM HYDROCHLORIDE 30 MG: 30 TABLET, FILM COATED ORAL at 00:08

## 2022-07-31 RX ADMIN — DILTIAZEM HYDROCHLORIDE 30 MG: 30 TABLET, FILM COATED ORAL at 06:14

## 2022-07-31 RX ADMIN — ENOXAPARIN SODIUM 40 MG: 100 INJECTION SUBCUTANEOUS at 09:28

## 2022-07-31 RX ADMIN — BUPROPION HYDROCHLORIDE 150 MG: 150 TABLET, FILM COATED, EXTENDED RELEASE ORAL at 09:28

## 2022-07-31 RX ADMIN — DILTIAZEM HYDROCHLORIDE 120 MG: 120 CAPSULE, COATED, EXTENDED RELEASE ORAL at 13:43

## 2022-07-31 RX ADMIN — ACETAMINOPHEN 650 MG: 325 TABLET ORAL at 09:28

## 2022-07-31 ASSESSMENT — PAIN DESCRIPTION - DESCRIPTORS: DESCRIPTORS: ACHING

## 2022-07-31 ASSESSMENT — PAIN DESCRIPTION - FREQUENCY: FREQUENCY: INTERMITTENT

## 2022-07-31 ASSESSMENT — PAIN SCALES - GENERAL: PAINLEVEL_OUTOF10: 3

## 2022-07-31 ASSESSMENT — PAIN DESCRIPTION - ORIENTATION: ORIENTATION: MID

## 2022-07-31 ASSESSMENT — PAIN DESCRIPTION - ONSET: ONSET: GRADUAL

## 2022-07-31 ASSESSMENT — PAIN DESCRIPTION - LOCATION: LOCATION: HEAD

## 2022-07-31 NOTE — CONSULTS
Cardiology Consultation                                                                    Pt Name: Melia Lisa  Age: 39 y.o. Sex: female  : 1980  Location: [facility]/2156-99    Referring Physician: Sampson Braswell MD  Primary cardiologist: niko Ramirez      Reason for Consult:     Reason for Consultation/Chief Complaint: AFRVR    HPI:      Melia Lisa is a 39 y.o. female with a past medical history of depression and ADHD. Patient presented to the emergency room on  with palpitations, dyspnea and dizziness x2 days. She was found to have AF RVR. Her magnesium was also low at 1.6. CTPA was unremarkable. TSH normal.  ECG consistent with AF  bpm, no ischemic changes. Patient reports no previous similar symptoms. She admits to taking Wellbutrin for her depression and ADHD. She also takes Hydroxycut over-the-counter which contains a significant amount of caffeine. Additionally she admits to caffeinated drinks and alcoholic beverages 3-5 drinks about 3 times per week. Patient was given diltiazem 20 mg IV x1 and started on diltiazem drip. Early a.m. diltiazem drip was changed to diltiazem 30 mg p.o. every 6 hours. Telemetry now shows AF with controlled ventricular response in the 80s. Patient reports no symptoms. Histories     Past Medical History:   has a past medical history of Anxiety associated with depression, Attention deficit hyperactivity disorder (ADHD), combined type, and Thoracogenic scoliosis of thoracic region. Surgical History:   has a past surgical history that includes Ovary removal (Right) and [place] tooth extraction (N/A). Social History:   reports that she quit smoking about 15 years ago. Her smoking use included cigarettes. She started smoking about 21 years ago. She smoked an average of .5 packs per day. She has never used smokeless tobacco. She reports current alcohol use. Drug: Marijuana [place]).      Family History:  No evidence for sudden cardiac death or premature CAD      Medications:       Home Medications  Were reviewed and are listed in nursing record. and/or listed below  Prior to Admission medications    Medication Sig Start Date End Date Taking? Authorizing Provider   NONFORMULARY hydroxycut   Yes Historical Provider, MD   gabapentin (NEURONTIN) 100 MG capsule Take 1 capsule by mouth 3 times daily for 60 days. 2/10/22 4/11/22  Hamzah Lazaro MD   buPROPion (WELLBUTRIN XL) 150 MG extended release tablet Take 1 tablet by mouth every morning 1/6/22   Kylah Jones, APRN - CNP          Inpatient Medications:   [START ON 8/1/2022] rivaroxaban  20 mg Oral Daily    dilTIAZem  120 mg Oral Daily    buPROPion  150 mg Oral QAM    sodium chloride flush  5-40 mL IntraVENous 2 times per day       IV drips:   sodium chloride         PRN:  sodium chloride flush, sodium chloride, ondansetron **OR** ondansetron, polyethylene glycol, acetaminophen **OR** acetaminophen, perflutren lipid microspheres    Allergy:     Patient has no known allergies. Review of Systems:     All 12 point review of symptoms completed. Pertinent positives identified in the HPI, all other review of symptoms negative as below. CONSTITUTIONAL: No fatigue  SKIN: No rash or pruritis. EYES: No visual changes or diplopia. No scleral icterus. ENT: No Headaches, hearing loss or vertigo. No mouth sores or sore throat. CARDIOVASCULAR: No chest pain/chest pressure/chest discomfort. +  palpitations. No edema. RESPIRATORY: +  dyspnea. No cough or wheezing, no sputum production. GASTROINTESTINAL: No N/V/D. No abdominal pain, appetite loss, blood in stools. GENITOURINARY: No dysuria, trouble voiding, or hematuria. MUSCULOSKELETAL:  No gait disturbance, weakness or joint complaints. NEUROLOGICAL: No headache, diplopia, change in muscle strength, numbness or tingling. No change in gait, balance, coordination, mood, affect, memory, mentation, behavior.   ENDOCRINE: No excessive thirst, fluid intake, or urination. No tremor. HEMATOLOGIC: No abnormal bruising or bleeding. ALLERGY: No nasal congestion or hives. Physical Examination:     Vitals:    07/31/22 0443 07/31/22 0600 07/31/22 0830 07/31/22 1233   BP: 111/70  107/73 128/69   Pulse: 90 90 91 92   Resp: 20 20 18   Temp: 97.6 °F (36.4 °C)  97.3 °F (36.3 °C) 97.7 °F (36.5 °C)   TempSrc: Oral  Axillary Axillary   SpO2: 97%  97% 99%   Weight: 174 lb 9.7 oz (79.2 kg)      Height:           Wt Readings from Last 3 Encounters:   07/31/22 174 lb 9.7 oz (79.2 kg)   01/31/22 191 lb 4.8 oz (86.8 kg)   05/18/21 202 lb (91.6 kg)         General Appearance:  Alert, cooperative, no distress, appears stated age Appropriate weight   Head:  Normocephalic, without obvious abnormality, atraumatic   Eyes:  PERRL, conjunctiva/corneas clear EOM intact  Ears normal   Throat no lesions       Nose: Nares normal, no drainage or sinus tenderness   Throat: Lips, mucosa, and tongue normal   Neck: Supple, symmetrical, trachea midline, no adenopathy, thyroid: not enlarged, symmetric, no tenderness/mass/nodules, no carotid bruit. Lungs:   Respirations unlabored, clear to auscultation bilaterally, without any wheezes, rubs or ronchi. Chest Wall:  No tenderness or deformity   Heart:  Irregular rhythm, rate is controlled, S1, S2 normal, there is no murmur, there is no rub or gallop, cannot assess jvd, no bilateral lower extremity edema   Abdomen:   Soft, non-tender, bowel sounds active all four quadrants,  no masses, no organomegaly       Extremities: Extremities normal, atraumatic, no cyanosis.     Pulses: 2+ and symmetric   Skin: Skin color, texture, turgor normal, no rashes or lesions   Pysch: Normal mood and affect   Neurologic: Normal gross motor and sensory exam.  Cranial nerves intact        Labs:     Recent Labs     07/30/22  0924 07/31/22  0540    137   K 3.9 4.0   BUN 7 8   CREATININE 0.6 0.7    103   CO2 26 26   GLUCOSE 111* 88   CALCIUM 9.4 8.6 MG 1.60*  --      Recent Labs     07/30/22  0924 07/31/22  0540   WBC 7.8 4.9   HGB 13.4 13.0   HCT 40.0 39.7    289   MCV 95.6 97.1     No results for input(s): CHOLTOT, TRIG, HDL, CHOLHDL, LDL in the last 72 hours. Invalid input(s): [facility],[address], [address]  Recent Labs     07/30/22 0924   INR 1.00     Recent Labs     07/30/22 0924   TROPONINI <0.01     No results for input(s): BNP in the last 72 hours. No results for input(s): TSH in the last 72 hours. No results for input(s): CHOL, HDL, LDLCALC, TRIG in the last 72 hours.]    Lab Results   Component Value Date    TROPONINI <0.01 07/30/2022         Telemetry:     Telemetry personally reviewed:  AFCVR    Assessment / Plan:     1. PAF with RVR. It is most likely due to combination of caffeinated drinks, Hydroxycut, alcohol, Wellbutrin. Rate is now controlled on diltiazem. 2.  ADHD and depression on Wellbutrin  3. Hypomagnesemia. Most likely due to alcohol abuse. - Will change diltiazem short acting to diltiazem CD1 120 mg p.o. daily to prevent recurrence of A. fib RVR. - We will start Xarelto 20 mg p.o. daily  - I strongly advised patient to reduce the amount of caffeine she consumes and stop Hydroxycut, reduce alcohol intake. - Patient may be released home today and follow-up in my clinic within a couple of weeks. At that time, assuming patient has complied with my instructions regarding alcohol and caffeine, will repeat an ECG. There is a possibility patient may convert into sinus rhythm with the above treatment plan. Otherwise at that time we will schedule a DC cardioversion after 4 weeks of uninterrupted anticoagulation.  - No further cardiac testing is necessary at this time (echo can be done as an outpatient). Patient may be discharged home today on the above meds and follow up with me in 2 weeks. Please call me with any questions.                        I have personally reviewed the reports and images of labs, radiological studies, cardiac studies including ECG's and telemetry, current and old medical records. The note was completed using EMR and Dragon dictation system. Every effort was made to ensure accuracy; however, inadvertent computerized transcription errors may be present. All questions and concerns were addressed to the patient/family. Alternatives to my treatment were discussed. I would like to thank you for providing me the opportunity to participate in the care of your patient. If you have any questions, please do not hesitate to contact me.      Travon Murray MD, [facility], [address]  The [address]  [address]  [address] [place] [address]  Ph: [phone]  Fax: [phone]

## 2022-07-31 NOTE — PROGRESS NOTES
Hospitalist Progress Note      PCP: Jatinder Sorto APRN - CNP    Date of Admission: 7/30/2022    Chief Complaint: Palpitations    Hospital Course: 59-year-old female with past medical history of depression presented to the hospital with palpitation going on for the past couple of days. Subjective: No acute events ported overnight. Patient states that she feels better. Had intermittent palpitations throughout the day yesterday. Denies any chest pain, shortness of breath. Medications:  Reviewed    Infusion Medications    sodium chloride       Scheduled Medications    buPROPion  150 mg Oral QAM    sodium chloride flush  5-40 mL IntraVENous 2 times per day    enoxaparin  40 mg SubCUTAneous Daily    dilTIAZem  30 mg Oral 4 times per day     PRN Meds: sodium chloride flush, sodium chloride, ondansetron **OR** ondansetron, polyethylene glycol, acetaminophen **OR** acetaminophen, perflutren lipid microspheres      Intake/Output Summary (Last 24 hours) at 7/31/2022 0930  Last data filed at 7/31/2022 0600  Gross per 24 hour   Intake 490 ml   Output 0 ml   Net 490 ml       Physical Exam Performed:    /73   Pulse 91   Temp 97.3 °F (36.3 °C) (Axillary)   Resp 20   Ht 5' 10\" (1.778 m)   Wt 174 lb 9.7 oz (79.2 kg)   SpO2 97%   BMI 25.05 kg/m²     General appearance: No apparent distress, appears stated age and cooperative. HEENT: Pupils equal, round, and reactive to light. Conjunctivae/corneas clear. Neck: Supple, with full range of motion. No jugular venous distention. Trachea midline. Respiratory:  Normal respiratory effort. Clear to auscultation, bilaterally without Rales/Wheezes/Rhonchi. Cardiovascular: Regular rate and rhythm with normal S1/S2 without murmurs, rubs or gallops. Abdomen: Soft, non-tender, non-distended with normal bowel sounds. Musculoskeletal: No clubbing, cyanosis or edema bilaterally. Full range of motion without deformity.   Skin: Skin color, texture, turgor normal. No rashes or lesions. Neurologic:  Neurovascularly intact without any focal sensory/motor deficits. Cranial nerves: II-XII intact, grossly non-focal.  Psychiatric: Alert and oriented, thought content appropriate, normal insight  Capillary Refill: Brisk,3 seconds, normal   Peripheral Pulses: +2 palpable, equal bilaterally       Labs:   Recent Labs     07/30/22  0924 07/31/22  0540   WBC 7.8 4.9   HGB 13.4 13.0   HCT 40.0 39.7    289     Recent Labs     07/30/22  0924 07/31/22  0540    137   K 3.9 4.0    103   CO2 26 26   BUN 7 8   CREATININE 0.6 0.7   CALCIUM 9.4 8.6     Recent Labs     07/30/22  0924   AST 16   ALT 12   BILITOT <0.2   ALKPHOS 57     Recent Labs     07/30/22  0924   INR 1.00     Recent Labs     07/30/22  0924   TROPONINI <0.01       Urinalysis:    No results found for: [facility], BACTERIA, RBCUA, BLOODU, SPECGRAV, GLUCOSEU    Radiology:  CT CHEST PULMONARY EMBOLISM W CONTRAST   Final Result      No evidence of pulmonary embolism or acute intrathoracic abnormality. Nonspecific small pulmonary nodules, likely benign. Following the Fleischner Society 2017 guidelines for multiple solid nodules <6 mm, if patient is low risk no routine follow-up is suggested. If patient is high risk, then optional CT at 12 months is    suggested. qzxv      Nonspecific asymmetric soft tissue density in the medial left breast, potentially normal fibroglandular tissue. Recommend correlation with mammography. INCIDENTAL FINDINGS: None. XR CHEST (2 VW)   Final Result      No acute radiographic abnormality of the chest.              Assessment/Plan:    Atrial fibrillation with RVR  -Patient endorses taking supplement called Hydroxycut which contains caffeine  - Continue Cardizem, echo pending  - Cardiology consult  - Telemetry  - Keep K greater than 4, magnesium greater than 2     Depression  - Continue Wellbutrin    DVT Prophylaxis: Lovenox  Diet: ADULT DIET;  Regular  Code Status: Full Code    PT/OT Eval Status: n/a    Dispo -pending cardiology evaluation    Cas Gomez, MD

## 2022-07-31 NOTE — PLAN OF CARE
Problem: Cardiovascular - Adult  Goal: Maintains optimal cardiac output and hemodynamic stability  7/31/2022 0659 by Zhou Andrade RN  Outcome: Progressing  Flowsheets (Taken 7/31/2022 1028)  Maintains optimal cardiac output and hemodynamic stability: Monitor blood pressure and heart rate  Note: Pt's HR 80s-90s. A. Fib on telemetry. Pt receiving PO diltiazem. VSS. Will continue to monitor.

## 2022-07-31 NOTE — CARE COORDINATION
Case Management Assessment            Discharge Note                    Date / Time of Note: 7/31/2022 1:42 PM                  Discharge Note Completed by: Marybeth Miramontes RN    Patient Name: Jamey Saini   YOB: 1980  Diagnosis: Hypomagnesemia [E83.42]  Atrial fibrillation with rapid ventricular response ([address] [I48.91]  Atrial fibrillation with RVR ([address] [I48.91]   Date / Time: 7/30/2022  9:05 AM    Current PCP: RAMAKRISHNA Caldwell - CNP  Clinic patient: No    Hospitalization in the last 30 days: No    Advance Directives:  Code Status: Full Code  [place] DNR form completed and on chart: Not Indicated    Financial:  Payor: Angelica Coronause / Plan: Ortiz Brower [address] / Product Type: *No Product type* /      Pharmacy:    [place] [address] [phone] - F [phone]  [address] [place] [address] To [facility] [address]  Phone: [phone] Fax: [phone]    [facility], [address] [facility] [address] [phone] - F [phone]  [address] [place] [address]  Phone: [phone] Fax: [phone]      Assistance purchasing medications?: Potential Assistance Purchasing Medications: No  Assistance provided by Case Management: None at this time    Does patient want to participate in local refill/ meds to beds program?:      Meds To Beds General Rules:  1. Can ONLY be done Monday- Friday between 8:30am-5pm  2. Prescription(s) must be in pharmacy by 3pm to be filled same day  3. Copy of patient's insurance/ prescription drug card and patient face sheet must be sent along with the prescription(s)  4. Cost of Rx cannot be added to hospital bill. If financial assistance is needed, please contact unit  or ;  or  CANNOT provide pharmacy voucher for patients co-pays  5.  Patients can then  the prescription on their way out of the hospital at discharge, or pharmacy can deliver to the bedside if staff is available. (payment due at time of pick-up or delivery - cash, check, or card accepted)     Able to afford home medications/ co-pay costs: Yes    ADLS:  Current PT AM-PAC Score:   /24  Current OT AM-PAC Score:   /24      DISCHARGE Disposition: Home- No Services Needed    LOC at discharge: Not Applicable  GENESIS Completed: Not Indicated    Notification completed in HENS/PAS?:  Not Applicable    IMM Completed:   Not Indicated    Transportation:  Transportation PLAN for discharge: family   Mode of Transport: [address] ordered at discharge: Not [address]: Not Applicable  Orders faxed: No    Durable Medical Equipment:  DME Provider: none  Equipment obtained during hospitalization:     Home Oxygen and Respiratory Equipment:  Oxygen needed at discharge?: Not [address]: Not Applicable  Portable tank available for discharge?: Not Indicated    Dialysis:  Dialysis patient: No    Dialysis Center:  Not Applicable      Additional CM Notes: Pt from home will DC home with family support no needs from CM will follow up OP with Cardiology. The Plan for Transition of Care is related to the following treatment goals of Hypomagnesemia [E83.42]  Atrial fibrillation with rapid ventricular response (HCC) [I48.91]  Atrial fibrillation with RVR ([address] [I48.91]    The Patient and/or patient representative Felisha and her family were provided with a choice of provider and agrees with the discharge plan Yes    Freedom of choice list was provided with basic dialogue that supports the patient's individualized plan of care/goals and shares the quality data associated with the providers.  Not Indicated    Care Transitions patient: Yes    Orelia Sicard, RN  [facility]  Case Management Department  Ph: [phone]  Fax: [phone]

## 2022-08-01 ENCOUNTER — CARE COORDINATION (OUTPATIENT)
Dept: OTHER | Facility: CLINIC | Age: 42
End: 2022-08-01

## 2022-08-01 NOTE — CARE COORDINATION
Iona 45 Transitions Initial Follow Up Call    Call within 2 business days of discharge: Yes    Patient: Teddy Pike Patient : 1980   MRN: [id number]  Reason for Admission: Tachycardia  Discharge Date: 22 RARS: Readmission Risk Score: 4.3      Last Discharge [facility]       Date Complaint Diagnosis Description Type Department Provider    22 Tachycardia Atrial fibrillation with rapid ventricular response ([address] . .. ED to Hosp-Admission (Discharged) (ADMIT) [place] [address] PCU Pita Sumner MD; Comfort Baer,. .. Care Transitions 24 Hour Call    Schedule Follow Up Appointment with PCP: Declined  Do you have a copy of your discharge instructions?: Yes  Do you have all of your prescriptions and are they filled?: Yes  Have you been contacted by a [facility] Pharmacist?: No  Have you scheduled your follow up appointment?: No (Comment: Calling today to schedule)  Do you feel like you have everything you need to keep you well at home?: Yes  Care Transitions Interventions    Specialty Service Referral: Completed             Was this an external facility discharge? No Discharge Facility: [facility] to be reviewed by the provider   Additional needs identified to be addressed with provider: No  none         Method of communication with provider : chart routing    Advance Care Planning:   Does patient have an Advance Directive: not on file. Associate Care Manager [facility]) contacted the patientby telephone to perform post hospital discharge assessment. Verified name and  with patientas identifiers. Provided introduction to self, and explanation of the ACM role. ACM reviewed discharge instructions, medical action plan and red flags with patient who verbalized understanding. Patient given an opportunity to ask questions and does not have any further questions or concerns at this time. Were discharge instructions available to patient? Yes.    Reviewed appropriate site of care based on symptoms and resources available to patient including:   PCP  Specialist  Benefits related nurse triage line  Urgent care clinics  MyChart Messaging. The patient agrees to contact the PCP office for questions related to their healthcare. Patient reports that she is feeling better. She was just Dc'd at 6:00 am today. She is currently at home. She was able to get her new prescriptions filled. She denies any questions or concerns regarding medications. She is able to describe Red Flag symptoms to report. She was given the numbers to call for cardiology follow up and will call later today for an appt. She denies any immediate ACM needs at this time and is agreeable to follow up contact. Medication reconciliation was performed with patient, who verbalizes understanding of administration of home medications. Advised obtaining a 90-day supply of all daily and as-needed medications. Was patient discharged with a pulse oximeter? no    Education Provided/ Reinforced:   Red Flag symptoms to report  Self monitoring compliance  Symptom management  Medication compliance  Specialist appointment compliance  Utilization of appropriate level of care: Right Care, Right Place, Right Time  Reinforced Discharge instructions    Non-face-to-face services provided:  Obtained and reviewed discharge summary and/or continuity of care documents  Assessment and support for treatment adherence and medication management-Patient is able to describe Red Flag symptoms to report, is able to describe medication regimen and improtance and benefits of compliance, and will call for follow up appts as recommended      Discussed follow-up appointments. If no appointment was previously scheduled, appointment scheduling offered: Yes, declined   Is follow up appointment scheduled within 7 days of discharge? To be scheduled  [facility] follow up appointment(s): No future appointments.   Non-[facility] follow up appointment(s): None      ACM provided contact information. Plan for follow-up call in 3-5 days based on severity of symptoms and risk factors. Plan for next call:   Red Flag symptoms to report  Medication compliance  Provider follow up appointment compliance  Utilization of appropriate level of care  Discuss Plan of Care  Assess for Ongoing Needs    ACM provided contact information for future needs. Follow Up  No future appointments.     Kamilah Schumacher RN

## 2022-08-04 ENCOUNTER — CARE COORDINATION (OUTPATIENT)
Dept: OTHER | Facility: CLINIC | Age: 42
End: 2022-08-04

## 2022-08-04 NOTE — CARE COORDINATION
Iona 45 Transitions Follow Up Call    2022    Patient: Perla Pump  Patient : 1980   MRN: [id number]  Reason for Admission: A-Fib with RVR  Discharge Date: 22 RARS: Readmission Risk Score: 4.3      Care Transitions Subsequent and Final Call    Schedule Follow Up Appointment with PCP: Completed  Subsequent and Final Calls  Do you have any ongoing symptoms?: No  Have your medications changed?: No  Do you have any questions related to your medications?: No  Do you currently have any active services?: No  Do you have any needs or concerns that I can assist you with?: No  Identified Barriers: None  Care Transitions Interventions    Specialty Service Referral: Completed    Other Interventions:           Associate Care Manager (ACM) contacted the patient by telephone to follow up on progress, reinforce previous education, and discuss any new issues or concerns. Verified name and  with patient as identifiers. ACM reviewed discharge instructions, medical action plan and red flags with patient and discussed any barriers to care and/or understanding of plan of care after discharge. Discussed appropriate site of care based on symptoms and resources available to patient including: PCP  Specialist  Benefits related nurse triage line  Urgent care clinics  [employer] Messaging. The patient agrees to contact the PCP office for questions related to their healthcare. Patient reports she is feeling much better. The palpitations finally stopped yesterday. Discussed current medication regimen and patient has all medications filled and taking as directed. She has her follow up appts scheduled and follow up testing scheduled. She has returned to work and is feeling well overall. She denies any immediate ACM needs and is agreeable to follow up contact.        Education and Interventions provided   Red Flag symptoms to report  Self monitoring compliance  Symptom management  Provider follow up appointment compliance  Ordered diagnostic testing compliance  Utilization of appropriate level of care: Right Care, Right Place, Right Time      ACM provided contact information for future needs. Plan for follow-up call in 10-14 days based on severity of symptoms and risk factors.     Plan for next call:   Symptom management  Provider follow up appointment compliance  Routine/ Diagnostic testing compliance  Discuss Plan of Care  Assess for Ongoing Needs     Follow Up  Future Appointments   Date Time Provider Ny Villarreal   8/15/2022  8:00 AM SCHEDULE, [facility] JOSELO CARDIO ECHO JOSELO CARDIO [place]   8/18/2022  2:30 PM Sarah Gonzáles MD JOSELO CARDIO [place]       Jamshid Weston RN

## 2022-08-06 NOTE — DISCHARGE SUMMARY
Hospitalist Discharge Summary    Patient ID:  Jonh [facility]  [id number]  25 y.o.  1980    Admit date: 7/30/2022    Discharge date: 7/31/2022    Disposition: home    Admission Diagnoses:   Patient Active Problem List   Diagnosis    Encounter to establish care    Anxiety    Weight gain    Atrial fibrillation with RVR [facility])       Discharge Diagnoses: Principal Problem:    Atrial fibrillation with RVR ([address]  Resolved Problems:    * No resolved hospital problems. *      Code Status:  Prior    Condition:  Stable    Discharge Diet: Diet:  No diet orders on file    PCP to do list: Follow for improvement of symptoms    Hospital Course: 25-year-old female with past medical history of depression presented to the hospital with palpitation going on for the past couple of days. Patient states that her heart has been beating fast for the past couple of days, it started without any inciting factors. She had such an episode couple weeks ago but it lasted only a few minutes. But for the past couple of days she feels like her heart has been racing nonstop. This has been accompanied with some exertional shortness of breath as well as dizziness. Since her symptoms were getting better she decided to come to the hospital.  On arrival she was noted to be in atrial fibrillation with rvr. Lab work showed mild hypomagnesemia 1.6. Patient had a CT PA performed which was negative for acute pulmonary embolism. Patient was admitted for further work-up and management. Patient was admitted following problems were addressed       Atrial fibrillation with RVR  -Patient endorses taking supplement called Hydroxycut which contains caffeine  - Cardiology was consulted. Patient will be continued on Cardizem 120 mg daily. She was started on Xarelto 20 mg daily. Plans to follow-up outpatient with cardiology to repeat an EKG and see if she is still in A. fib.   Otherwise patient will need cardioversion after 4 weeks of uninterrupted anticoagulation.  -Echo to be performed as outpatient     Depression  - Continue Wellbutrin    Discharge Medications:   Discharge Medication List as of 7/31/2022  3:32 PM        START taking these medications    Details   rivaroxaban (XARELTO) 20 MG TABS tablet Take 1 tablet by mouth in the morning., Disp-30 tablet, R-0Normal      dilTIAZem (CARDIZEM CD) 120 MG extended release capsule Take 1 capsule by mouth in the morning., Disp-30 capsule, R-3Normal           Discharge Medication List as of 7/31/2022  3:32 PM        Discharge Medication List as of 7/31/2022  3:32 PM        CONTINUE these medications which have NOT CHANGED    Details   buPROPion (WELLBUTRIN XL) 150 MG extended release tablet Take 1 tablet by mouth every morning, Disp-30 tablet, R-3Normal           Discharge Medication List as of 7/31/2022  3:32 PM        STOP taking these medications       NONFORMULARY Comments:   Reason for Stopping:         gabapentin (NEURONTIN) 100 MG capsule Comments:   Reason for Stopping:                   Procedures: none    Assessment on Discharge: Stable, improved     Discharge ROS:  A complete review of systems was asked and negative except for none    Discharge Exam:  /71   Pulse 90   Temp 98.1 °F (36.7 °C) (Oral)   Resp 18   Ht 5' 10\" (1.778 m)   Wt 174 lb 9.7 oz (79.2 kg)   SpO2 99%   BMI 25.05 kg/m²     Gen: NAD  HEENT: NC/AT, moist mucous membranes, no oropharyngeal erythema or exudate  Neck: supple, trachea midline, no anterior cervical or SC LAD  Heart:  Normal s1/s2, RRR, no murmurs, gallops, or rubs.  no leg edema  Lungs:  CTA bilaterally, no wheeze,no rales or rhonchi, no use of accessory muscles  Abd: bowel sounds present, soft, nontender, nondistended, no masses  Extrem:  No clubbing, cyanosis,  no edema  Skin: no lesion or masses  Psych:  A & O x3  Neuro: grossly intact, moves all four extremities    Pertinent Studies During Hospital Stay:  Radiology:  XR CHEST (2 VW)    Result Date: 7/30/2022  EXAM: XR CHEST (2 VW). DATE: 7/30/2022 9:52 AM. INDICATION: New onset atrial fibrillation with RVR COMPARISON: None TECHNIQUE: Standard per department protocol. FINDINGS: Support devices: None Cardiomediastinal silhouette normal. No focal consolidation, pleural fluid collection, or pneumothorax. S-shaped scoliosis of the thoracolumbar spine. No acute radiographic abnormality of the chest.    CT CHEST PULMONARY EMBOLISM W CONTRAST    Result Date: 7/30/2022  STUDY DATE:  7/30/2022 11:15 AM EXAM: CT CHEST PULMONARY EMBOLISM W CONTRAST INDICATION: New onset atrial fib with RVR; elevated D-dimer COMPARISON: None TECHNIQUE: Spiral CT of the chest with multiplanar reformatted images and axial maximum intensity projection images provided for review. Axial and coronal MIP images were obtained. Individualized dose optimization technique was used in order to meet ALARA standards for radiation dose reduction. In addition to vendor specific dose reduction algorithms, the dose reduction techniques vary based on the specific scanner utilized but frequently include automated exposure control, adjustment of the mA and/or kV according to patient size, and use of iterative reconstruction technique. CONTRAST: None FINDINGS:  images: No additional abnormality. PULMONARY ARTERIES: Adequacy of evaluation: Adequate Pulmonary arteries: Normal. No right heart strain. OTHER STRUCTURES: Airways: Airways are patent. Lungs: Minimal basilar dependent atelectasis. No consolidation. Nodules: Scattered pulmonary nodules are noted as follows, measured on series 2: *  2 mm nodule right upper lobe (image 101) *  2 mm nodule left lower lobe (image 173) Pleura: No pleural effusions or significant pleural thickening. Heart and great vessels: Heart size is normal. Other mediastinal structures and lower neck: Normal. Adenopathy: None.  Chest wall: Asymmetric 1.6 cm soft tissue in the medial left breast (series 2, image 102). Osseous structures: Right convex scoliosis. Upper abdomen: Unremarkable     No evidence of pulmonary embolism or acute intrathoracic abnormality. Nonspecific small pulmonary nodules, likely benign. Following the Fleischner Society 2017 guidelines for multiple solid nodules <6 mm, if patient is low risk no routine follow-up is suggested. If patient is high risk, then optional CT at 12 months is suggested. qzxv Nonspecific asymmetric soft tissue density in the medial left breast, potentially normal fibroglandular tissue. Recommend correlation with mammography. INCIDENTAL FINDINGS: None. Last Labs on Discharge:     No results found for this or any previous visit (from the past 24 hour(s)). Follow up: with RAMAKRISHNA Alvarado CNP    Note that over 30 minutes was spent in preparing discharge papers, discussing discharge with patient, medication review, etc.    Thank you RAMAKRISHNA Alvarado CNP for the opportunity to be involved in this patient's care. If you have any questions or concerns please feel free to contact me at [phone].     Electronically signed by Citlalli Harmon MD on 8/6/2022 at 2:10 PM

## 2022-08-15 ENCOUNTER — PROCEDURE VISIT (OUTPATIENT)
Dept: CARDIOLOGY CLINIC | Age: 42
End: 2022-08-15
Payer: COMMERCIAL

## 2022-08-15 DIAGNOSIS — I48.91 ATRIAL FIBRILLATION, UNSPECIFIED TYPE (HCC): Primary | ICD-10-CM

## 2022-08-15 LAB
LV EF: 60 %
LVEF MODALITY: NORMAL

## 2022-08-15 PROCEDURE — 93306 TTE W/DOPPLER COMPLETE: CPT | Performed by: INTERNAL MEDICINE

## 2022-08-18 ENCOUNTER — OFFICE VISIT (OUTPATIENT)
Dept: CARDIOLOGY CLINIC | Age: 42
End: 2022-08-18
Payer: COMMERCIAL

## 2022-08-18 VITALS
HEART RATE: 88 BPM | BODY MASS INDEX: 25.25 KG/M2 | DIASTOLIC BLOOD PRESSURE: 80 MMHG | SYSTOLIC BLOOD PRESSURE: 120 MMHG | WEIGHT: 176 LBS

## 2022-08-18 DIAGNOSIS — I48.0 PAROXYSMAL ATRIAL FIBRILLATION (HCC): Primary | ICD-10-CM

## 2022-08-18 PROCEDURE — 99214 OFFICE O/P EST MOD 30 MIN: CPT | Performed by: INTERNAL MEDICINE

## 2022-08-18 RX ORDER — ASPIRIN 81 MG/1
81 TABLET ORAL DAILY
Qty: 90 TABLET | Refills: 3
Start: 2022-08-18

## 2022-08-18 NOTE — PROGRESS NOTES
Cc: PAF RVR    HPI:     Bard Delgado is a 39 y.o. female with a past medical history of depression, ADHD, PAFRVR. Patient was admitted to [facility] 7/30 - 7/31/2022 for PAF RVR. She admits to taking Wellbutrin for her depression and ADHD. She also takes Hydroxycut over-the-counter which contains a significant amount of caffeine. Additionally she admits to caffeinated drinks and alcoholic beverages 3-5 drinks about 3 times per week. She was started on diltiazem and Xarelto. Echo 8/15/2022: Normal except for mild TR. Patient comes to the clinic today for follow-up. She has significantly decreased amount of alcohol, has decreased the amount of caffeine to 1 cup of coffee a day and has stopped Hydroxycut. She now reports no symptoms. She is compliant with her medications. Histories     Past Medical History:   has a past medical history of Anxiety associated with depression, Attention deficit hyperactivity disorder (ADHD), combined type, and Thoracogenic scoliosis of thoracic region. Surgical History:   has a past surgical history that includes Ovary removal (Right) and [place] tooth extraction (N/A). Social History:   reports that she quit smoking about 15 years ago. Her smoking use included cigarettes. She started smoking about 21 years ago. She smoked an average of .5 packs per day. She has never used smokeless tobacco. She reports current alcohol use. Drug: Marijuana Charmaine Canales). Family History:  No evidence for sudden cardiac death or premature CAD      Medications:     Home medications were reviewed and are listed below    Prior to Admission medications    Medication Sig Start Date End Date Taking?  Authorizing Provider   aspirin EC 81 MG EC tablet Take 1 tablet by mouth daily 8/18/22  Yes Wilfredo Grady MD   dilTIAZem (CARDIZEM CD) 120 MG extended release capsule Take 1 capsule by mouth in the morning. 7/31/22  Yes Agnes Pineda MD   buPROPion (WELLBUTRIN XL) 150 MG extended Nares normal, no drainage or sinus tenderness   Throat: Lips, mucosa, and tongue normal   Neck: Supple, symmetrical, trachea midline, no adenopathy, thyroid: not enlarged, symmetric, no tenderness/mass/nodules, no carotid bruit       Lungs:   Clear to auscultation bilaterally, respirations unlabored   Chest Wall:  No tenderness or deformity   Heart:  Regular rhythm, rate is controlled, S1, S2 normal, there is no murmur, there is no rub or gallop, cannot assess jvd, no bilateral lower extremity edema   Abdomen:   Soft, non-tender, bowel sounds active all four quadrants,  no masses, no organomegaly       Extremities: Extremities normal, atraumatic, no cyanosis   Pulses: 2+ and symmetric   Skin: Skin color, texture, turgor normal, no rashes or lesions   Pysch: Normal mood and affect   Neurologic: Normal gross motor and sensory exam.  Cranial nerves intact        Labs:     Lab Results   Component Value Date    WBC 4.9 07/31/2022    HGB 13.0 07/31/2022    HCT 39.7 07/31/2022    MCV 97.1 07/31/2022     07/31/2022     Lab Results   Component Value Date     07/31/2022    K 4.0 07/31/2022     07/31/2022    CO2 26 07/31/2022    BUN 8 07/31/2022    CREATININE 0.7 07/31/2022    GLUCOSE 88 07/31/2022    CALCIUM 8.6 07/31/2022    PROT 7.1 07/30/2022    LABALBU 4.2 07/30/2022    BILITOT <0.2 07/30/2022    ALKPHOS 57 07/30/2022    AST 16 07/30/2022    ALT 12 07/30/2022    LABGLOM >60 07/31/2022    GFRAA >60 07/31/2022    AGRATIO 1.4 07/30/2022    GLOB 2.4 05/18/2021         Lab Results   Component Value Date    CHOL 194 05/18/2021     Lab Results   Component Value Date    TRIG 69 05/18/2021     Lab Results   Component Value Date    HDL 91 (H) 05/18/2021     Lab Results   Component Value Date    LDLCALC 89 05/18/2021     Lab Results   Component Value Date    LABVLDL 14 05/18/2021     No results found for: [facility]    Lab Results   Component Value Date    INR 1.00 07/30/2022    PROTIME 13.1 07/30/2022       The 10-year ASCVD risk score (Brian Ashraf., et al., 2013) is: 0.2%    Values used to calculate the score:      Age: 39 years      Sex: Female      Is Non- : No      Diabetic: No      Tobacco smoker: No      Systolic Blood Pressure: 553 mmHg      Is BP treated: No      HDL Cholesterol: 91 mg/dL      Total Cholesterol: 194 mg/dL      Assessment / Plan:      Diagnosis Orders   1. Paroxysmal atrial fibrillation (HCC)             1.  PAF with RVR:  Patient currently remains in sinus rhythm. She has made some lifestyle changes that will help maintain sinus rhythm. - Continue with diltiazem CD1 120 mg daily  - Patient may switch from Xarelto to baby aspirin (she reports heavy menses on Xarelto). We will schedule a follow up visit in 6 months      I have spent 35 minutes of face to face time with the patient with more than 50% spent counseling and coordinating care. I have personally reviewed the reports and images of labs, radiological studies, cardiac studies including ECG's and telemetry, current and old medical records. The note was completed using EMR and Dragon dictation system. Every effort was made to ensure accuracy; however, inadvertent computerized transcription errors may be present. All questions and concerns were addressed to the patient/family. Alternatives to my treatment were discussed. I would like to thank you for providing me the opportunity to participate in the care of your patient. If you have any questions, please do not hesitate to contact me.      Ramona Villalta MD, [facility] - [address] Office Phone: [phone]  Fax: [phone]

## 2022-08-19 ENCOUNTER — CARE COORDINATION (OUTPATIENT)
Dept: OTHER | Facility: CLINIC | Age: 42
End: 2022-08-19

## 2022-08-19 NOTE — CARE COORDINATION
Iona 45 Transitions Follow Up Call    2022    Patient: Tennille Sosa  Patient : 1980   MRN: [id number]  Reason for Admission: A-fib  Discharge Date: 22 RARS: Readmission Risk Score: 4.3         Care Transitions Subsequent and Final Call    Schedule Follow Up Appointment with PCP: Completed  Subsequent and Final Calls  Care Transitions Interventions    Specialty Service Referral: Completed    Other Interventions:           ACM contacted patient by telephone to follow up on progress, reinforce previous education/ provide patient education, and discuss any new issues or concerns. HIPAA compliant message left requesting a return phone call at their convenience to discuss. Will continue to follow.    Follow Up  Future Appointments   Date Time Provider Department Center   2023  1:30 PM MD Nelida Perez RN

## 2022-08-30 DIAGNOSIS — F41.9 ANXIETY: ICD-10-CM

## 2022-08-30 RX ORDER — BUPROPION HYDROCHLORIDE 150 MG/1
150 TABLET ORAL EVERY MORNING
Qty: 30 TABLET | Refills: 3 | OUTPATIENT
Start: 2022-08-30

## 2022-08-31 DIAGNOSIS — F41.9 ANXIETY: ICD-10-CM

## 2022-09-01 RX ORDER — BUPROPION HYDROCHLORIDE 150 MG/1
150 TABLET ORAL EVERY MORNING
Qty: 30 TABLET | Refills: 3 | OUTPATIENT
Start: 2022-09-01

## 2022-09-07 ENCOUNTER — CARE COORDINATION (OUTPATIENT)
Dept: OTHER | Facility: CLINIC | Age: 42
End: 2022-09-07

## 2022-09-07 ENCOUNTER — TELEPHONE (OUTPATIENT)
Dept: CARDIOLOGY CLINIC | Age: 42
End: 2022-09-07

## 2022-09-07 NOTE — TELEPHONE ENCOUNTER
Pt states that she had fluttering feeling in her chest for the last 2 days no chest pain or SOB pt is wonderfing if she needs to change her medication please call [phone]

## 2022-09-07 NOTE — CARE COORDINATION
Iona 45 Transitions Follow Up Call    2022    Patient: Gordy Dalton  Patient : 1980   MRN: [id number]  Reason for Admission: A-fib  Discharge Date: 22 RARS: Readmission Risk Score: 4.3      Care Transitions Subsequent and Final Call    Schedule Follow Up Appointment with PCP: Completed  Subsequent and Final Calls  Do you have any ongoing symptoms?: Yes  Onset of Patient-reported symptoms: Today  Patient-reported symptoms: Other  Interventions for patient-reported symptoms: Notified PCP/Physician  Have your medications changed?: No  Do you have any questions related to your medications?: No  Do you currently have any active services?: No  Do you have any needs or concerns that I can assist you with?: No  Identified Barriers: None  Care Transitions Interventions    Specialty Service Referral: Completed    Other Interventions:           Associate Care Manager (ACM) contacted the patient by telephone to follow up on progress, reinforce previous education, and discuss any new issues or concerns. Verified name and  with patient as identifiers. ACM reviewed discharge instructions, medical action plan and red flags with patient and discussed any barriers to care and/or understanding of plan of care after discharge. Discussed appropriate site of care based on symptoms and resources available to patient including: PCP  Specialist  Benefits related nurse triage line  Urgent care clinics  When to call [address] The patient agrees to contact the PCP office for questions related to their healthcare. Patient states that just last she stated feeling like she may be having palpitations again. She tried to do a VV ut he call did not go through. She does have an appt with her PCP 22. Disused that she may need to contact cardiology also if cariology with be managing her a- fib/ palpitations. She will give them a call tomorrow.   Much supportive listening and self management reinforcement. Patient verbalized understanding and is agreeable to follow up contact. Education Provided/ Reinforced:   Red Flag symptoms to report  Self monitoring compliance  Symptom management  Medication compliance  Specialist appointment compliance  Utilization of appropriate level of care: Right Care, Right Place, Right Time  Referral to [address] [place] [address] for follow-up call in 1-2 days based on severity of symptoms and risk factors. Plan for next call:   Medication compliance  Provider follow up appointment compliance  Referral to Ambulatory Care Coordination  Discuss Plan of Care  Assess for Ongoing Needs     .    Follow Up  Future Appointments   Date Time Provider Ny Villarreal   9/9/2022 10:45 AM Tyson Vences APRN - CNP [facility] [address] [place] - [place]   2/28/2023  1:30 PM MD Nelida Soto RN

## 2022-09-09 ENCOUNTER — OFFICE VISIT (OUTPATIENT)
Dept: CARDIOLOGY CLINIC | Age: 42
End: 2022-09-09
Payer: COMMERCIAL

## 2022-09-09 ENCOUNTER — OFFICE VISIT (OUTPATIENT)
Dept: FAMILY MEDICINE CLINIC | Age: 42
End: 2022-09-09
Payer: COMMERCIAL

## 2022-09-09 VITALS
DIASTOLIC BLOOD PRESSURE: 70 MMHG | SYSTOLIC BLOOD PRESSURE: 110 MMHG | BODY MASS INDEX: 24.68 KG/M2 | HEART RATE: 110 BPM | WEIGHT: 172 LBS

## 2022-09-09 VITALS
TEMPERATURE: 98.4 F | DIASTOLIC BLOOD PRESSURE: 80 MMHG | HEART RATE: 68 BPM | SYSTOLIC BLOOD PRESSURE: 122 MMHG | BODY MASS INDEX: 24.62 KG/M2 | WEIGHT: 172 LBS | OXYGEN SATURATION: 99 % | HEIGHT: 70 IN

## 2022-09-09 DIAGNOSIS — I48.0 PAROXYSMAL ATRIAL FIBRILLATION (HCC): Primary | ICD-10-CM

## 2022-09-09 DIAGNOSIS — R00.2 PALPITATIONS: ICD-10-CM

## 2022-09-09 DIAGNOSIS — F41.9 ANXIETY: ICD-10-CM

## 2022-09-09 PROBLEM — Z76.89 ENCOUNTER TO ESTABLISH CARE: Status: RESOLVED | Noted: 2021-05-18 | Resolved: 2022-09-09

## 2022-09-09 PROBLEM — R63.5 WEIGHT GAIN: Status: RESOLVED | Noted: 2021-05-18 | Resolved: 2022-09-09

## 2022-09-09 LAB — PAP SMEAR, EXTERNAL: NORMAL

## 2022-09-09 PROCEDURE — 99213 OFFICE O/P EST LOW 20 MIN: CPT | Performed by: NURSE PRACTITIONER

## 2022-09-09 PROCEDURE — 99214 OFFICE O/P EST MOD 30 MIN: CPT | Performed by: NURSE PRACTITIONER

## 2022-09-09 PROCEDURE — 93000 ELECTROCARDIOGRAM COMPLETE: CPT | Performed by: NURSE PRACTITIONER

## 2022-09-09 RX ORDER — BUPROPION HYDROCHLORIDE 150 MG/1
150 TABLET ORAL EVERY MORNING
Qty: 30 TABLET | Refills: 3 | Status: CANCELLED | OUTPATIENT
Start: 2022-09-09

## 2022-09-09 RX ORDER — METOPROLOL SUCCINATE 25 MG/1
25 TABLET, EXTENDED RELEASE ORAL DAILY
Qty: 90 TABLET | Refills: 3 | Status: SHIPPED | OUTPATIENT
Start: 2022-09-09

## 2022-09-09 RX ORDER — BUSPIRONE HYDROCHLORIDE 10 MG/1
10 TABLET ORAL 3 TIMES DAILY
Qty: 90 TABLET | Refills: 0 | Status: SHIPPED | OUTPATIENT
Start: 2022-09-09 | End: 2022-10-27 | Stop reason: SDUPTHER

## 2022-09-09 ASSESSMENT — PATIENT HEALTH QUESTIONNAIRE - PHQ9
SUM OF ALL RESPONSES TO PHQ QUESTIONS 1-9: 0
1. LITTLE INTEREST OR PLEASURE IN DOING THINGS: 0
SUM OF ALL RESPONSES TO PHQ9 QUESTIONS 1 & 2: 0
2. FEELING DOWN, DEPRESSED OR HOPELESS: 0
SUM OF ALL RESPONSES TO PHQ QUESTIONS 1-9: 0

## 2022-09-09 NOTE — PROGRESS NOTES
Felisha Guidry (:  1980) is a 43 y.o. female,Established patient, here for evaluation of the following chief complaint(s):  Medication Refill      ASSESSMENT/PLAN:  1. Anxiety  Assessment & Plan:  Okay to stay off the Wellbutrin  BuSpar 5 mg 3 times daily. If this not helpful she can go up to 3 mg 3 times daily. Courage her to discuss beta-blocker with her cardiologist        Return in about 3 months (around 2022). SUBJECTIVE/OBJECTIVE:  ) Presents for follow-up. Is being seen by cardiology for paroxysmal A. fib. They believe this started when she was taking weight loss medication stimulant. She has not had any episodes since that time. She is currently taking diltiazem for this and a baby aspirin. She sees Dr. Shani Marley with cardiology. She has been off her Wellbutrin for over a month. States she does have some anxiety but not as much as before. Now her anxiety is around concern for recurrence of the A. fib. She is interested in taking some anxiety medication that is not stimulating. States she often will wake up in the middle of the night with her heart pounding worrying about things, or she is unable to sleep because she gets to worrying about things. She feels sometimes that her heart is racing although it is regular and states this is usually triggered by anxiety. She is getting  this weekend. Is very excited about this. Current Outpatient Medications   Medication Sig Dispense Refill    busPIRone (BUSPAR) 10 MG tablet Take 1 tablet by mouth 3 times daily 90 tablet 0    aspirin EC 81 MG EC tablet Take 1 tablet by mouth daily 90 tablet 3    dilTIAZem (CARDIZEM CD) 120 MG extended release capsule Take 1 capsule by mouth in the morning. 30 capsule 3     No current facility-administered medications for this visit. Review of Systems   All other systems reviewed and are negative.     Vitals:    22 1050   BP: 122/80   Site: Left Upper Arm   Position: Sitting   Cuff Size: Medium Adult   Pulse: 68   Temp: 98.4 °F (36.9 °C)   SpO2: 99%   Weight: 172 lb (78 kg)   Height: 5' 10\" (1.778 m)       Physical Exam  Constitutional:       Appearance: Normal appearance. She is well-developed and normal weight. HENT:      Head: Normocephalic. Right Ear: Tympanic membrane normal.      Left Ear: Tympanic membrane normal.      Mouth/Throat:      Mouth: Mucous membranes are moist.   Eyes:      Pupils: Pupils are equal, round, and reactive to light. Cardiovascular:      Rate and Rhythm: Normal rate and regular rhythm. Heart sounds: Normal heart sounds. Pulmonary:      Effort: Pulmonary effort is normal.      Breath sounds: Normal breath sounds. Musculoskeletal:         General: Normal range of motion. Cervical back: Normal range of motion. Skin:     General: Skin is warm and dry. Neurological:      Mental Status: She is alert and oriented to person, place, and time. An electronic signature was used to authenticate this note.     --Traci Juarez, RAMAKRISHNA - CNP

## 2022-09-09 NOTE — PROGRESS NOTES
CC palpitations     HPI:  43 y.o. with pAF who is here with c/o fluttering. Tuesday until now she's had palpitations off and on throughout the day. Fluttering lasts hours but are more noticeable in the evening. She feels stress also makes it worse. She only drinks 1 cup of coffee a day. No c/o cp, sob, LH/dizziness, syncope or falls. No LE edema, orthopnea or PND. No n/v/d, fever or GI/ bleeding    She is getting  next week     Past Medical History:   Diagnosis Date    Anxiety associated with depression     Attention deficit hyperactivity disorder (ADHD), combined type     Thoracogenic scoliosis of thoracic region      Past Surgical History:   Procedure Laterality Date    OVARY REMOVAL Right     WISDOM TOOTH EXTRACTION N/A      Family History   Problem Relation Age of Onset    Heart Attack Father      Social History     Tobacco Use    Smoking status: Former     Packs/day: 0.50     Types: Cigarettes     Start date: 1/1/2001     Quit date: 1/1/2007     Years since quitting: 15.6    Smokeless tobacco: Never   Substance Use Topics    Alcohol use: Yes     Comment: occ     Allergies:Patient has no known allergies. Review of Systems  General: No changes in weight, fatigue, or night sweats. HEENT: No blurry or decreased vision. No changes in hearing, nasal discharge or sore throat. Cardiovascular:  See HPI. Respiratory: No cough, hemoptysis, or wheezing. Gastrointestinal:  No abdominal pain, hematochezia, melana, constipation, diarrhea, or history of GI ulcers. Genito-Urinary: No dysuria or hematuria. No urgency or polyuria. Musculoskeletal:  No complaints of joint pain, joint swelling or muscular weakness/soreness. Neurological:  No dizziness, headaches, numbness/tingling, speech problems or weakness. Psychological:  No anxiety or depression. Hematological and Lymphatic: No abnormal bleeding or bruising, blood clots, jaundice or swollen lymph nodes.   Endocrine:   No malaise/lethargy, palpitations, polydipsia/polyuria, temperature intolerance or unexpected weight changes  Skin:  No rashes or non-healing ulcers. Physical Exam:  /70   Pulse (!) 110   Wt 172 lb (78 kg)   BMI 24.68 kg/m²    General (appearance):  No acute distress  Eyes: anicteric   Neck: soft, No JVD  Ears/Nose/Mouth/Thorat: No cyanosis  CV: RRR   Respiratory:  clear, normal effort  GI: soft, non-tender, non-distended  Skin: Warm, dry. No rashes  Neuro/Psych: Alert and oriented x 3. Appropriate behavior  Ext:  No c/c.  No edema  Pulses:  2+ radial     Weight  Wt Readings from Last 3 Encounters:   22 176 lb (79.8 kg)   22 174 lb 9.7 oz (79.2 kg)   22 191 lb 4.8 oz (86.8 kg)          CBC:   Lab Results   Component Value Date    WBC 4.9 2022    HGB 13.0 2022    HCT 39.7 2022    MCV 97.1 2022     2022     BMP:  Lab Results   Component Value Date    CREATININE 0.7 2022    BUN 8 2022     2022    K 4.0 2022     2022    CO2 26 2022     Mag:   Lab Results   Component Value Date/Time    MG 1.60 2022 09:24 AM     LIVER PROFILE:   Lab Results   Component Value Date    ALT 12 2022    AST 16 2022    ALKPHOS 57 2022    BILITOT <0.2 2022     PT/INR:   Lab Results   Component Value Date    INR 1.00 2022    PROTIME 13.1 2022     Pro-BNP   Lab Results   Component Value Date/Time    PROBNP 131 2022 09:24 AM     LIPIDS:  No components found for: CHLPL  Lab Results   Component Value Date    TRIG 69 2021     Lab Results   Component Value Date    HDL 91 (H) 2021     Lab Results   Component Value Date    LDLCALC 89 2021     Lab Results   Component Value Date    LABVLDL 14 2021     TSH:No results found for: TSH, O7MYHFH, A5YEZDH, THYROIDAB  2022 TSH 0.77    IMAGIN/15/2022 Echo   Normal left ventricle size, wall thickness and systolic function with an   estimated ejection fraction of 60%. No regional wall motion abnormalities are seen. E/e\"= 5. Diastolic filling parameters suggests normal diastolic function. Mild tricuspid regurgitation. RVSP 20mmHg. 7/30/2022 ECG: A fib RVR, non-specific tw abnl    7/30/2022 CTA PE     No evidence of pulmonary embolism or acute intrathoracic abnormality. Medications:   Current Outpatient Medications   Medication Sig Dispense Refill    aspirin EC 81 MG EC tablet Take 1 tablet by mouth daily 90 tablet 3    dilTIAZem (CARDIZEM CD) 120 MG extended release capsule Take 1 capsule by mouth in the morning. 30 capsule 3    buPROPion (WELLBUTRIN XL) 150 MG extended release tablet Take 1 tablet by mouth every morning 30 tablet 3     No current facility-administered medications for this visit. Assessment:  1.  Paroxysmal atrial fibrillation (HCC)    2. Palpitations        Plan:  pAF/palpitations   ECG: Sinus tachy, PAC   Start toprol 12.5 mg po qHS   ASA 81 mg daily    Diltiazem 120 mg po daily    14 day monitor after her wedding         Reviewed most recent: CBC, BMP, LFT, Lipids, Mag, PT/INR, BNP, TSH  Reviewed most recent: ECG, Echo, CTA,

## 2022-10-10 ENCOUNTER — CARE COORDINATION (OUTPATIENT)
Dept: OTHER | Facility: CLINIC | Age: 42
End: 2022-10-10

## 2022-10-10 NOTE — CARE COORDINATION
Ambulatory Care Coordination Note  10/10/2022    ACC: Howard Porter, RN    ACM contacted patient by telephone to follow up on progress, reinforce previous education/ provide patient education, and discuss any new issues or concerns. Not able to leave a message as the mailbox is full. Will continue to follow and send [employer] message in the meantime. Prior to Admission medications    Medication Sig Start Date End Date Taking? Authorizing Provider   metoprolol succinate (TOPROL XL) 25 MG extended release tablet Take 1 tablet by mouth daily 9/9/22   Dwayne Sawyer APRN - CNP   aspirin EC 81 MG EC tablet Take 1 tablet by mouth daily 8/18/22   Marisela Man MD   dilTIAZem (CARDIZEM CD) 120 MG extended release capsule Take 1 capsule by mouth in the morning. 7/31/22   Houston Robledo MD   gabapentin (NEURONTIN) 100 MG capsule Take 1 capsule by mouth 3 times daily for 60 days.  2/10/22 7/31/22  Flor Helton MD       Future Appointments   Date Time Provider Ny Villarreal   2/28/2023  1:30 PM Marisela Man MD [facility]

## 2022-10-27 ENCOUNTER — CARE COORDINATION (OUTPATIENT)
Dept: OTHER | Facility: CLINIC | Age: 42
End: 2022-10-27

## 2022-10-27 RX ORDER — BUSPIRONE HYDROCHLORIDE 10 MG/1
10 TABLET ORAL 3 TIMES DAILY
Qty: 90 TABLET | Refills: 0 | Status: SHIPPED | OUTPATIENT
Start: 2022-10-27 | End: 2022-11-26

## 2022-10-27 NOTE — CARE COORDINATION
Ambulatory Care Coordination Note  10/27/2022    ACC: Lalo Brown, MICHA      Care Coordination Interventions    Referral from Primary Care Provider: No  Suggested Interventions and Community Resources  Specialty Services Referral: Completed (Comment: 10/27/22: Established with Cardiology)       [facility]) contacted the patient by telephone to follow up on progress, discuss new issues or concerns, and reinforce/ provide pt education. Verified name and  with patient as identifiers. Assessment:  Patient reports that she is doing much better. She did follow up with Cardiology and was put on a beta blocker. She has not had any further issues with her heart and no new issues or concerns. She has her follow up appts scheduled. She denies any ongoing ACM needs at this time. Interventions:    ACM reviewed and updated CC protocol, SDOH, medications, goals, education and disease specific assessments. Counseling and education provided at today's visit on:   Red Flag symptoms to report  Symptom management  Medication compliance  Specialist appointment compliance  Utilization of appropriate level of care: Right Care, Right Place, Right Time  Disease Process and complications    Medication reconciliation was performed with patient, who verbalizes understanding of administration of home medications. Advised obtaining a 90-day supply of all daily and as-needed medications. Reinforced resources available to patient including:   PCP  Specialist  Benefits related nurse triage line  Urgent care clinics  MyChart Messaging. Plan:  ACM will sign of as patient has follow up appointments with providers scheduled/ completed routine/ diagnostic testing completed/ scheduled compliant with medication regimen appropriate utilization of services no identified social determinants of health to be addressed.   Patient denies any ongoing ACM needs at this time and has ACM contact information for any future needs. Patient verbalized understanding and is agreeable. Prior to Admission medications    Medication Sig Start Date End Date Taking? Authorizing Provider   busPIRone (BUSPAR) 10 MG tablet Take 1 tablet by mouth 3 times daily 10/27/22 11/26/22  RAMAKRISHNA Le - CNP   metoprolol succinate (TOPROL XL) 25 MG extended release tablet Take 1 tablet by mouth daily 9/9/22   RAMAKRISHNA Lord - CNP   aspirin EC 81 MG EC tablet Take 1 tablet by mouth daily 8/18/22   Carlyle Machado MD   dilTIAZem (CARDIZEM CD) 120 MG extended release capsule Take 1 capsule by mouth in the morning. 7/31/22   Mj Villavicencio MD   gabapentin (NEURONTIN) 100 MG capsule Take 1 capsule by mouth 3 times daily for 60 days.  2/10/22 7/31/22  Madhavi Mccurdy MD       Future Appointments   Date Time Provider Ny Villarreal   2/28/2023  1:30 PM Carlyle Machado MD [facility]

## 2022-12-15 NOTE — TELEPHONE ENCOUNTER
LOV: 9/9/2022  FOV: N/A Problem: PAIN - ADULT  Goal: Verbalizes/displays adequate comfort level or baseline comfort level  Description: Interventions:  - Encourage patient to monitor pain and request assistance  - Assess pain using appropriate pain scale  - Administer analgesics based on type and severity of pain and evaluate response  - Implement non-pharmacological measures as appropriate and evaluate response  - Consider cultural and social influences on pain and pain management  - Notify physician/advanced practitioner if interventions unsuccessful or patient reports new pain  Outcome: Progressing     Problem: INFECTION - ADULT  Goal: Absence or prevention of progression during hospitalization  Description: INTERVENTIONS:  - Assess and monitor for signs and symptoms of infection  - Monitor lab/diagnostic results  - Monitor all insertion sites, i e  indwelling lines, tubes, and drains  - Monitor endotracheal if appropriate and nasal secretions for changes in amount and color  - [place] appropriate cooling/warming therapies per order  - Administer medications as ordered  - Instruct and encourage patient and family to use good hand hygiene technique  - Identify and instruct in appropriate isolation precautions for identified infection/condition  Outcome: Progressing     Problem: SAFETY ADULT  Goal: Patient will remain free of falls  Description: INTERVENTIONS:  - Educate patient/family on patient safety including physical limitations  - Instruct patient to call for assistance with activity   - Consult OT/PT to assist with strengthening/mobility   - Keep Call bell within reach  - Keep bed low and locked with side rails adjusted as appropriate  - Keep care items and personal belongings within reach  - Initiate and maintain comfort rounds  - Apply yellow socks and bracelet for high fall risk patients  - Consider moving patient to room near nurses station  Outcome: Progressing  Goal: Maintain or return to baseline ADL function  Description: INTERVENTIONS:  -  Assess patient's ability to carry out ADLs; assess patient's baseline for ADL function and identify physical deficits which impact ability to perform ADLs (bathing, care of mouth/teeth, toileting, grooming, dressing, etc )  - Assess/evaluate cause of self-care deficits   - Assess range of motion  - Assess patient's mobility; develop plan if impaired  - Assess patient's need for assistive devices and provide as appropriate  - Encourage maximum independence but intervene and supervise when necessary  - Involve family in performance of ADLs  - Assess for home care needs following discharge   - Consider OT consult to assist with ADL evaluation and planning for discharge  - Provide patient education as appropriate  Outcome: Progressing  Goal: Maintains/Returns to pre admission functional level  Description: INTERVENTIONS:  - Perform BMAT or MOVE assessment daily    - Set and communicate daily mobility goal to care team and patient/family/caregiver     - Collaborate with rehabilitation services on mobility goals if consulted  - Out of bed for meals 3 times a day  - Out of bed for toileting  - Record patient progress and toleration of activity level   Outcome: Progressing     Problem: DISCHARGE PLANNING  Goal: Discharge to home or other facility with appropriate resources  Description: INTERVENTIONS:  - Identify barriers to discharge w/patient and caregiver  - Arrange for needed discharge resources and transportation as appropriate  - Identify discharge learning needs (meds, wound care, etc )  - Arrange for interpretive services to assist at discharge as needed  - Refer to Case Management Department for coordinating discharge planning if the patient needs post-hospital services based on physician/advanced practitioner order or complex needs related to functional status, cognitive ability, or social support system  Outcome: Progressing     Problem: Knowledge Deficit  Goal: Patient/family/caregiver demonstrates understanding of disease process, treatment plan, medications, and discharge instructions  Description: Complete learning assessment and assess knowledge base    Interventions:  - Provide teaching at level of understanding  - Provide teaching via preferred learning methods  Outcome: Progressing     Problem: METABOLIC, FLUID AND ELECTROLYTES - ADULT  Goal: Electrolytes maintained within normal limits  Description: INTERVENTIONS:  - Monitor labs and assess patient for signs and symptoms of electrolyte imbalances  - Administer electrolyte replacement as ordered  - Monitor response to electrolyte replacements, including repeat lab results as appropriate  - Instruct patient on fluid and nutrition as appropriate  Outcome: Progressing  Goal: Fluid balance maintained  Description: INTERVENTIONS:  - Monitor labs   - Monitor I/O and WT  - Instruct patient on fluid and nutrition as appropriate  - Assess for signs & symptoms of volume excess or deficit  Outcome: Progressing     Problem: MOBILITY - ADULT  Goal: Maintain or return to baseline ADL function  Description: INTERVENTIONS:  -  Assess patient's ability to carry out ADLs; assess patient's baseline for ADL function and identify physical deficits which impact ability to perform ADLs (bathing, care of mouth/teeth, toileting, grooming, dressing, etc )  - Assess/evaluate cause of self-care deficits   - Assess range of motion  - Assess patient's mobility; develop plan if impaired  - Assess patient's need for assistive devices and provide as appropriate  - Encourage maximum independence but intervene and supervise when necessary  - Involve family in performance of ADLs  - Assess for home care needs following discharge   - Consider OT consult to assist with ADL evaluation and planning for discharge  - Provide patient education as appropriate  Outcome: Progressing  Goal: Maintains/Returns to pre admission functional level  Description: INTERVENTIONS:  - Perform BMAT or MOVE assessment daily    - Set and communicate daily mobility goal to care team and patient/family/caregiver     - Collaborate with rehabilitation services on mobility goals if consulted  - Out of bed for meals 3 times a day  - Out of bed for toileting  - Record patient progress and toleration of activity level   Outcome: Progressing

## 2022-12-21 RX ORDER — BUSPIRONE HYDROCHLORIDE 10 MG/1
TABLET ORAL
Qty: 90 TABLET | Refills: 0 | Status: SHIPPED | OUTPATIENT
Start: 2022-12-21

## 2023-02-23 RX ORDER — BUSPIRONE HYDROCHLORIDE 10 MG/1
TABLET ORAL
Qty: 90 TABLET | Refills: 0 | Status: SHIPPED | OUTPATIENT
Start: 2023-02-23

## 2023-02-23 RX ORDER — DILTIAZEM HYDROCHLORIDE 120 MG/1
120 CAPSULE, COATED, EXTENDED RELEASE ORAL DAILY
Qty: 90 CAPSULE | Refills: 3 | Status: SHIPPED | OUTPATIENT
Start: 2023-02-23

## 2023-02-23 NOTE — TELEPHONE ENCOUNTER
Requested Prescriptions     Pending Prescriptions Disp Refills    dilTIAZem (CARDIZEM CD) 120 MG extended release capsule 90 capsule 3     Sig: Take 1 capsule by mouth daily            Last Office Visit: 9/9/2022     Next Office Visit: 3/3/2023       Last Labs: 7/31/2022

## 2023-03-03 ENCOUNTER — OFFICE VISIT (OUTPATIENT)
Dept: CARDIOLOGY CLINIC | Age: 43
End: 2023-03-03
Payer: COMMERCIAL

## 2023-03-03 VITALS
WEIGHT: 174.6 LBS | DIASTOLIC BLOOD PRESSURE: 80 MMHG | BODY MASS INDEX: 25.05 KG/M2 | HEART RATE: 77 BPM | SYSTOLIC BLOOD PRESSURE: 110 MMHG

## 2023-03-03 DIAGNOSIS — I48.0 PAROXYSMAL ATRIAL FIBRILLATION (HCC): Primary | ICD-10-CM

## 2023-03-03 PROCEDURE — 99214 OFFICE O/P EST MOD 30 MIN: CPT | Performed by: INTERNAL MEDICINE

## 2023-03-03 RX ORDER — METOPROLOL SUCCINATE 50 MG/1
50 TABLET, EXTENDED RELEASE ORAL DAILY
Qty: 90 TABLET | Refills: 3 | Status: SHIPPED | OUTPATIENT
Start: 2023-03-03

## 2023-03-04 NOTE — PROGRESS NOTES
Cc: PAF RVR    HPI:     Mayi Knight is a 39 y.o. female with a past medical history of depression, anxiety, ADHD, PAFRVR. Patient was admitted to [facility]. 7/30 - 7/31/2022 for PAF RVR. She admits to taking Wellbutrin for her depression and ADHD. She also takes Hydroxycut over-the-counter which contains a significant amount of caffeine. Additionally she admits to caffeinated drinks and alcoholic beverages 3-5 drinks about 3 times per week. She was started on diltiazem and Xarelto. Echo 8/15/2022: Normal except for mild TR. She has significantly decreased amount of alcohol, has decreased the amount of caffeine to 1 cup of coffee a day and has stopped Hydroxycut. She was seen by Shyam Moyer NP on 9/9/23 and due to fluttering in her chest, Toprol 25 daily was added. She now reports no symptoms. She is compliant with her medications. Histories     Past Medical History:   has a past medical history of Anxiety associated with depression, Attention deficit hyperactivity disorder (ADHD), combined type, PAF (paroxysmal atrial fibrillation) ([facility] [address] and Thoracogenic scoliosis of thoracic region. Surgical History:   has a past surgical history that includes Ovary removal (Right) and [place] tooth extraction (N/A). Social History:   reports that she quit smoking about 16 years ago. Her smoking use included cigarettes. She started smoking about 22 years ago. She smoked an average of .5 packs per day. She has never used smokeless tobacco. She reports current alcohol use. Drug: Marijuana [place]). Family History:  No evidence for sudden cardiac death or premature CAD      Medications:     Home medications were reviewed and are listed below    Prior to Admission medications    Medication Sig Start Date End Date Taking?  Authorizing Provider   metoprolol succinate (TOPROL XL) 50 MG extended release tablet Take 1 tablet by mouth daily 3/3/23  Yes Nany Mcmillan MD   busPIRone (BUSPAR) 10 MG tablet TAKE 1 TABLET BY MOUTH 3 TIMES A DAY 2/23/23  Yes Tylerjovita Galvez APRN - CNP   aspirin EC 81 MG EC tablet Take 1 tablet by mouth daily 8/18/22  Yes Maribell Kirkpatrick MD   gabapentin (NEURONTIN) 100 MG capsule Take 1 capsule by mouth 3 times daily for 60 days. 2/10/22 7/31/22  Nisreen Harrington MD          Allergy:     Patient has no known allergies. Review of Systems:     All 12 point review of symptoms completed. Pertinent positives identified in the HPI, all other review of symptoms negative as below. CONSTITUTIONAL: No fatigue  SKIN: No rash or pruritis. EYES: No visual changes or diplopia. No scleral icterus. ENT: No Headaches, hearing loss or vertigo. No mouth sores or sore throat. CARDIOVASCULAR: No chest pain/chest pressure/chest discomfort. No palpitations. No edema. RESPIRATORY: No dyspnea. No cough or wheezing, no sputum production. GASTROINTESTINAL: No N/V/D. No abdominal pain, appetite loss, blood in stools. GENITOURINARY: No dysuria, trouble voiding, or hematuria. MUSCULOSKELETAL:  No gait disturbance, weakness or joint complaints. NEUROLOGICAL: No headache, diplopia, change in muscle strength, numbness or tingling. No change in gait, balance, coordination, mood, affect, memory, mentation, behavior. PSHYCH: No anxiety, loss of interest, change in sexual behavior, feelings of self-harm, or confusion. ENDOCRINE: No excessive thirst, fluid intake, or urination. No tremor. HEMATOLOGIC: No abnormal bruising or bleeding. ALLERGY: No nasal congestion or hives.       Physical Examination:     Vitals:    03/03/23 1530   BP: 110/80   Pulse: 77   Weight: 174 lb 9.6 oz (79.2 kg)       Wt Readings from Last 3 Encounters:   03/03/23 174 lb 9.6 oz (79.2 kg)   09/09/22 172 lb (78 kg)   09/09/22 172 lb (78 kg)         General Appearance:  Alert, cooperative, no distress, appears stated age Appropriate weight   Head:  Normocephalic, without obvious abnormality, atraumatic   Eyes: PERRL, conjunctiva/corneas clear EOM intact  Ears normal   Throat no lesions       Nose: Nares normal, no drainage or sinus tenderness   Throat: Lips, mucosa, and tongue normal   Neck: Supple, symmetrical, trachea midline, no adenopathy, thyroid: not enlarged, symmetric, no tenderness/mass/nodules, no carotid bruit       Lungs:   Clear to auscultation bilaterally, respirations unlabored   Chest Wall:  No tenderness or deformity   Heart:  Regular rhythm, rate is controlled, S1, S2 normal, there is no murmur, there is no rub or gallop, cannot assess jvd, no bilateral lower extremity edema   Abdomen:   Soft, non-tender, bowel sounds active all four quadrants,  no masses, no organomegaly       Extremities: Extremities normal, atraumatic, no cyanosis   Pulses: 2+ and symmetric   Skin: Skin color, texture, turgor normal, no rashes or lesions   Pysch: Normal mood and affect   Neurologic: Normal gross motor and sensory exam.  Cranial nerves intact        Labs:     Lab Results   Component Value Date    WBC 4.9 07/31/2022    HGB 13.0 07/31/2022    HCT 39.7 07/31/2022    MCV 97.1 07/31/2022     07/31/2022     Lab Results   Component Value Date     07/31/2022    K 4.0 07/31/2022     07/31/2022    CO2 26 07/31/2022    BUN 8 07/31/2022    CREATININE 0.7 07/31/2022    GLUCOSE 88 07/31/2022    CALCIUM 8.6 07/31/2022    PROT 7.1 07/30/2022    LABALBU 4.2 07/30/2022    BILITOT <0.2 07/30/2022    ALKPHOS 57 07/30/2022    AST 16 07/30/2022    ALT 12 07/30/2022    LABGLOM >60 07/31/2022    GFRAA >60 07/31/2022    AGRATIO 1.4 07/30/2022    GLOB 2.4 05/18/2021         Lab Results   Component Value Date    CHOL 194 05/18/2021     Lab Results   Component Value Date    TRIG 69 05/18/2021     Lab Results   Component Value Date    HDL 91 (H) 05/18/2021     Lab Results   Component Value Date    LDLCALC 89 05/18/2021     Lab Results   Component Value Date    LABVLDL 14 05/18/2021     No results found for: CHOLHDLRATIO    Lab Results   Component Value Date    INR 1.00 07/30/2022    PROTIME 13.1 07/30/2022       The 10-year ASCVD risk score (Krysta CHARLES, et al., 2019) is: 0.2%    Values used to calculate the score:      Age: 43 years      Sex: Female      Is Non- : No      Diabetic: No      Tobacco smoker: No      Systolic Blood Pressure: 604 mmHg      Is BP treated: No      HDL Cholesterol: 91 mg/dL      Total Cholesterol: 194 mg/dL      Assessment / Plan:      Diagnosis Orders   1. Paroxysmal atrial fibrillation (HCC)             1.  PAF with RVR:  Patient currently remains in sinus rhythm. She has made some lifestyle changes that will help maintain sinus rhythm. - Will increase Toprol to 50 mg daily and stop diltiazem  mg daily (patient prefers to minimize the amount of pills she takes; BB seem to help with her anxiety)  - Cw baby aspirin (she reports heavy menses on Xarelto). Return in about 1 year (around 3/3/2024). I have spent 35 minutes of reviewing records and face to face time with the patient with more than 50% spent counseling and coordinating care. I have personally reviewed the reports and images of labs, radiological studies, cardiac studies including ECG's and telemetry, current and old medical records. The note was completed using EMR and Dragon dictation system. Every effort was made to ensure accuracy; however, inadvertent computerized transcription errors may be present. All questions and concerns were addressed to the patient/family. Alternatives to my treatment were discussed. I would like to thank you for providing me the opportunity to participate in the care of your patient. If you have any questions, please do not hesitate to contact me.      Agustín Saravia MD, [address] Office Phone: [phone]  Fax: [phone]

## 2023-03-09 RX ORDER — BUSPIRONE HYDROCHLORIDE 10 MG/1
TABLET ORAL
Qty: 90 TABLET | Refills: 0 | Status: SHIPPED | OUTPATIENT
Start: 2023-03-09

## 2023-03-11 ENCOUNTER — HOSPITAL ENCOUNTER (EMERGENCY)
Age: 43
Discharge: HOME | End: 2023-03-12
Attending: EMERGENCY MEDICINE
Payer: COMMERCIAL

## 2023-03-11 DIAGNOSIS — O46.90 VAGINAL BLEEDING IN PREGNANCY: Primary | ICD-10-CM

## 2023-03-11 LAB
ABO/RH: NORMAL
ANTIBODY SCREEN: NORMAL
BACTERIA WET PREP: NORMAL
BACTERIA: ABNORMAL /HPF
BASOPHILS ABSOLUTE: 0 K/UL (ref 0–0.2)
BASOPHILS RELATIVE PERCENT: 0.6 %
BILIRUBIN URINE: NEGATIVE
BLOOD, URINE: ABNORMAL
CLARITY: CLEAR
CLUE CELLS: NORMAL
COLOR: YELLOW
CRYSTALS, UA: ABNORMAL /HPF
EOSINOPHILS ABSOLUTE: 0.1 K/UL (ref 0–0.6)
EOSINOPHILS RELATIVE PERCENT: 2 %
EPITHELIAL CELLS WET PREP: NORMAL
EPITHELIAL CELLS, UA: ABNORMAL /HPF (ref 0–5)
GLUCOSE URINE: NEGATIVE MG/DL
GONADOTROPIN, CHORIONIC (HCG) QUANT: 18.6 MIU/ML
HCT VFR BLD CALC: 38.3 % (ref 36–48)
HEMOGLOBIN: 12.9 G/DL (ref 12–16)
KETONES, URINE: NEGATIVE MG/DL
LEUKOCYTE ESTERASE, URINE: NEGATIVE
LYMPHOCYTES ABSOLUTE: 2.3 K/UL (ref 1–5.1)
LYMPHOCYTES RELATIVE PERCENT: 40.3 %
MCH RBC QN AUTO: 32 PG (ref 26–34)
MCHC RBC AUTO-ENTMCNC: 33.6 G/DL (ref 31–36)
MCV RBC AUTO: 95.2 FL (ref 80–100)
MICROSCOPIC EXAMINATION: YES
MONOCYTES ABSOLUTE: 0.7 K/UL (ref 0–1.3)
MONOCYTES RELATIVE PERCENT: 12.4 %
MUCUS: ABNORMAL /LPF
NEUTROPHILS ABSOLUTE: 2.6 K/UL (ref 1.7–7.7)
NEUTROPHILS RELATIVE PERCENT: 44.7 %
NITRITE, URINE: NEGATIVE
PDW BLD-RTO: 12.6 % (ref 12.4–15.4)
PH UA: 6 (ref 5–8)
PLATELET # BLD: 275 K/UL (ref 135–450)
PMV BLD AUTO: 8.3 FL (ref 5–10.5)
PROTEIN UA: NEGATIVE MG/DL
RBC # BLD: 4.02 M/UL (ref 4–5.2)
RBC UA: ABNORMAL /HPF (ref 0–4)
RBC WET PREP: NORMAL
SOURCE WET PREP: NORMAL
SPECIFIC GRAVITY UA: 1.02 (ref 1–1.03)
TRICHOMONAS PREP: NORMAL
URINE REFLEX TO CULTURE: ABNORMAL
URINE TYPE: ABNORMAL
UROBILINOGEN, URINE: 0.2 E.U./DL
WBC # BLD: 5.8 K/UL (ref 4–11)
WBC UA: ABNORMAL /HPF (ref 0–5)
WBC WET PREP: NORMAL
YEAST WET PREP: NORMAL

## 2023-03-11 PROCEDURE — 86850 RBC ANTIBODY SCREEN: CPT

## 2023-03-11 PROCEDURE — 85025 COMPLETE CBC W/AUTO DIFF WBC: CPT

## 2023-03-11 PROCEDURE — 87210 SMEAR WET MOUNT SALINE/INK: CPT

## 2023-03-11 PROCEDURE — 87491 CHLMYD TRACH DNA AMP PROBE: CPT

## 2023-03-11 PROCEDURE — 6370000000 HC RX 637 (ALT 250 FOR IP): Performed by: STUDENT IN AN ORGANIZED HEALTH CARE EDUCATION/TRAINING PROGRAM

## 2023-03-11 PROCEDURE — 84702 CHORIONIC GONADOTROPIN TEST: CPT

## 2023-03-11 PROCEDURE — 86900 BLOOD TYPING SEROLOGIC ABO: CPT

## 2023-03-11 PROCEDURE — 99283 EMERGENCY DEPT VISIT LOW MDM: CPT

## 2023-03-11 PROCEDURE — 86901 BLOOD TYPING SEROLOGIC RH(D): CPT

## 2023-03-11 PROCEDURE — 87591 N.GONORRHOEAE DNA AMP PROB: CPT

## 2023-03-11 PROCEDURE — 81001 URINALYSIS AUTO W/SCOPE: CPT

## 2023-03-11 RX ORDER — ACETAMINOPHEN 500 MG
1000 TABLET ORAL ONCE
Status: COMPLETED | OUTPATIENT
Start: 2023-03-11 | End: 2023-03-11

## 2023-03-11 RX ADMIN — ACETAMINOPHEN 1000 MG: 500 TABLET ORAL at 20:51

## 2023-03-11 ASSESSMENT — ENCOUNTER SYMPTOMS
VOMITING: 0
ABDOMINAL PAIN: 1
BACK PAIN: 0
CONSTIPATION: 0
COUGH: 0
SHORTNESS OF BREATH: 0
BLOOD IN STOOL: 0
DIARRHEA: 0
SORE THROAT: 0

## 2023-03-11 ASSESSMENT — PAIN SCALES - GENERAL
PAINLEVEL_OUTOF10: 2
PAINLEVEL_OUTOF10: 6
PAINLEVEL_OUTOF10: 2

## 2023-03-11 ASSESSMENT — PAIN DESCRIPTION - PAIN TYPE: TYPE: ACUTE PAIN

## 2023-03-11 ASSESSMENT — PAIN DESCRIPTION - LOCATION: LOCATION: ABDOMEN

## 2023-03-11 ASSESSMENT — PAIN DESCRIPTION - DESCRIPTORS: DESCRIPTORS: CRAMPING

## 2023-03-11 ASSESSMENT — PAIN - FUNCTIONAL ASSESSMENT
PAIN_FUNCTIONAL_ASSESSMENT: ACTIVITIES ARE NOT PREVENTED
PAIN_FUNCTIONAL_ASSESSMENT: 0-10

## 2023-03-11 ASSESSMENT — PAIN DESCRIPTION - ORIENTATION: ORIENTATION: LOWER

## 2023-03-12 ENCOUNTER — APPOINTMENT (OUTPATIENT)
Dept: ULTRASOUND IMAGING | Age: 43
End: 2023-03-12
Payer: COMMERCIAL

## 2023-03-12 VITALS
WEIGHT: 152.9 LBS | RESPIRATION RATE: 16 BRPM | DIASTOLIC BLOOD PRESSURE: 72 MMHG | HEART RATE: 75 BPM | OXYGEN SATURATION: 100 % | SYSTOLIC BLOOD PRESSURE: 121 MMHG | HEIGHT: 70 IN | TEMPERATURE: 97.9 F | BODY MASS INDEX: 21.89 KG/M2

## 2023-03-12 PROCEDURE — 76856 US EXAM PELVIC COMPLETE: CPT

## 2023-03-12 PROCEDURE — 76817 TRANSVAGINAL US OBSTETRIC: CPT

## 2023-03-12 ASSESSMENT — PAIN - FUNCTIONAL ASSESSMENT: PAIN_FUNCTIONAL_ASSESSMENT: NONE - DENIES PAIN

## 2023-03-12 ASSESSMENT — PAIN SCALES - GENERAL: PAINLEVEL_OUTOF10: 0

## 2023-03-12 NOTE — ED PROVIDER NOTES
ED Attending Attestation Note     Date of evaluation: 3/11/2023    This patient was seen by the resident. I have seen and examined the patient, agree with the workup, evaluation, management and diagnosis. The care plan has been discussed. Felisha is a 79-year-old G5, P2 at approximately 6 weeks from last LMP who presents to the emergency department today with vaginal bleeding. She has not seen an OB/GYN during this pregnancy. This was a home pregnancy test.  She has an appointment for them next week. On Wednesday, she noted some dark brown spotting. Today, she noted red blood. She had contacted them prior to today, and they felt that her symptoms were normal.  Due to the worsening bleeding today, she is presenting to the emergency department. She is not bleeding through any pads. She is on prenatal vitamins. She does not smoke or do recreational drugs. She does drink alcohol intermittently but has not since she has been pregnant. She denies any other complaints. On exam, lungs are clear and equal.  Abdomen is soft and nontender. Radial pulses 2+.        Tonya Bruno MD  03/11/23 2031

## 2023-03-12 NOTE — ED PROVIDER NOTES
[facility] RESIDENT NOTE       Date of evaluation: 3/11/2023    Chief Complaint     Vaginal Bleeding (Patient reports she is 6 weeks pregnant today and is having vaginal bleeding and cramping. She has not had US yet to confirm pregnancy. She has had positive home pregnancy tests. )    of Present Illness     Felisha Steele is a 43 y.o. female [id number] approximately 6 weeks gestation via LMP presents for evaluation of spotting and cramping. Patient reports she started having some brownish/dark bloody discharge within the last few days and now is having bright red spotting on the toilet paper within the last day with some associated suprapubic cramping today. Denies any associated fevers, chills, nausea, vomiting, diarrhea, urinary symptoms, or vaginal discharge preceding the onset of the symptoms. She has not yet had an ultrasound confirming location of this pregnancy. Her 2 deliveries were vaginal.  Denies any history of ectopic pregnancy. Review of Systems     Review of Systems   Constitutional:  Negative for chills and fever. HENT:  Negative for congestion and sore throat. Respiratory:  Negative for cough and shortness of breath. Gastrointestinal:  Positive for abdominal pain. Negative for blood in stool, constipation, diarrhea and vomiting. Genitourinary:  Positive for pelvic pain, vaginal bleeding and vaginal discharge. Negative for dysuria, frequency, hematuria and vaginal pain. Musculoskeletal:  Negative for back pain. Neurological:  Negative for dizziness and light-headedness. Past Medical, Surgical, Family, and Social History     She has a past medical history of Anxiety associated with depression, Attention deficit hyperactivity disorder (ADHD), combined type, PAF (paroxysmal atrial fibrillation) ([address] and Thoracogenic scoliosis of thoracic region.   She has a past surgical history that includes Ovary removal (Right) and [place] tooth extraction (N/A). Her family history includes Heart Attack in her father. She reports that she quit smoking about 16 years ago. Her smoking use included cigarettes. She started smoking about 22 years ago. She smoked an average of .5 packs per day. She has never used smokeless tobacco. She reports current alcohol use. Drug: Marijuana [place]). Medications     Discharge Medication List as of 3/12/2023  3:12 AM        CONTINUE these medications which have NOT CHANGED    Details   !! busPIRone (BUSPAR) 10 MG tablet TAKE ONE TABLET BY MOUTH THREE TIMES A DAY, Disp-90 tablet, R-0Normal      !! busPIRone (BUSPAR) 10 MG tablet TAKE 1 TABLET BY MOUTH 3 TIMES A DAY, Disp-90 tablet, R-0Normal      metoprolol succinate (TOPROL XL) 50 MG extended release tablet Take 1 tablet by mouth daily, Disp-90 tablet, R-3Normal      aspirin EC 81 MG EC tablet Take 1 tablet by mouth daily, Disp-90 tablet, R-3NO PRINT       ! ! - Potential duplicate medications found. Please discuss with provider. Allergies     She has No Known Allergies. Physical Exam     INITIAL VITALS: BP: 130/83, Temp: 97.9 °F (36.6 °C), Heart Rate: 76, Resp: 16, SpO2: 100 %   Physical Exam  Vitals and nursing note reviewed. Exam conducted with a chaperone present. Constitutional:       General: She is not in acute distress. HENT:      Head: Normocephalic and atraumatic. Mouth/Throat:      Mouth: Mucous membranes are moist.   Eyes:      Extraocular Movements: Extraocular movements intact. Pupils: Pupils are equal, round, and reactive to light. Cardiovascular:      Rate and Rhythm: Normal rate and regular rhythm. Pulmonary:      Effort: Pulmonary effort is normal. No respiratory distress. Breath sounds: Normal breath sounds. No wheezing. Abdominal:      General: There is no distension. Palpations: Abdomen is soft. Tenderness: There is no abdominal tenderness.    Genitourinary:     Comments: Moderate amount of dark red blood in the vaginal vault. No CMT, mild right adnexal tenderness, no left adnexal tenderness. Cervical os is fingertip dilated on my exam.  Skin:     General: Skin is warm and dry. Neurological:      General: No focal deficit present. Mental Status: She is alert and oriented to person, place, and time. Psychiatric:         Mood and Affect: Mood normal.         Behavior: Behavior normal.       DiagnosticResults         RADIOLOGY:  US OB TRANSVAGINAL   Final Result      Suspected left ectopic pregnancy. Recommended OB/GYN consultation. US PELVIS COMPLETE   Final Result      Suspected left ectopic pregnancy. Recommended OB/GYN consultation.                 LABS:   Results for orders placed or performed during the hospital encounter of 03/11/23   Wet prep, genital    Specimen: Vaginal   Result Value Ref Range    Trichomonas Prep None Seen     Yeast, Wet Prep None Seen     Clue Cells, Wet Prep None Seen     WBC, Wet Prep 1+     RBC, Wet Prep 2+     Epi Cells 1+     Bacteria <1+     Source Wet Prep Vaginal    HCG Serum, Quantitative   Result Value Ref Range    hCG Quant 18.6 <5.0 mIU/mL   CBC with Auto Differential   Result Value Ref Range    WBC 5.8 4.0 - 11.0 K/uL    RBC 4.02 4.00 - 5.20 M/uL    Hemoglobin 12.9 12.0 - 16.0 g/dL    Hematocrit 38.3 36.0 - 48.0 %    MCV 95.2 80.0 - 100.0 fL    MCH 32.0 26.0 - 34.0 pg    MCHC 33.6 31.0 - 36.0 g/dL    RDW 12.6 12.4 - 15.4 %    Platelets 419 290 - 826 K/uL    MPV 8.3 5.0 - 10.5 fL    Neutrophils % 44.7 %    Lymphocytes % 40.3 %    Monocytes % 12.4 %    Eosinophils % 2.0 %    Basophils % 0.6 %    Neutrophils Absolute 2.6 1.7 - 7.7 K/uL    Lymphocytes Absolute 2.3 1.0 - 5.1 K/uL    Monocytes Absolute 0.7 0.0 - 1.3 K/uL    Eosinophils Absolute 0.1 0.0 - 0.6 K/uL    Basophils Absolute 0.0 0.0 - 0.2 K/uL   Urinalysis with Reflex to Culture    Specimen: Urine   Result Value Ref Range    Color, UA Yellow Straw/Yellow    Clarity, UA Clear Clear    Glucose, Ur Negative Negative mg/dL    Bilirubin Urine Negative Negative    Ketones, Urine Negative Negative mg/dL    Specific Gravity, UA 1.020 1.005 - 1.030    Blood, Urine LARGE (A) Negative    pH, UA 6.0 5.0 - 8.0    Protein, UA Negative Negative mg/dL    Urobilinogen, Urine 0.2 <2.0 E.U./dL    Nitrite, Urine Negative Negative    Leukocyte Esterase, Urine Negative Negative    Microscopic Examination YES     Urine Type Other     Urine Reflex to Culture Not Indicated    Microscopic Urinalysis   Result Value Ref Range    Mucus, UA Rare (A) None Seen /LPF    WBC, UA None seen 0 - 5 /HPF    RBC, UA 11-20 (A) 0 - 4 /HPF    Epithelial Cells, UA 0-1 0 - 5 /HPF    Bacteria, UA Rare (A) None Seen /HPF    Crystals, UA Few Ca. Phos (A) None Seen /HPF   TYPE AND SCREEN   Result Value Ref Range    ABO/Rh B POS     Antibody Screen NEG        ED BEDSIDE ULTRASOUND:  No results found. RECENT VITALS:  BP: 121/72, Temp: 97.9 °F (36.6 °C), Heart Rate: 75,Resp: 16, SpO2: 100 %       ED Course     Nursing Notes, Past Medical Hx, Past Surgical Hx, Social Hx, Allergies, and Family Hx were reviewed. The patient was given the followingmedications:  Orders Placed This Encounter   Medications    acetaminophen (TYLENOL) tablet 1,000 mg       CONSULTS:  [facility] / ASSESSMENT / Ernestine Papo is a 43 y.o. female  approximately 6 weeks gestation presents for evaluation of spotting and suprapubic cramping. She is in no acute distress and hemodynamically stable, abdomen is soft and nontender. Pelvic exam with moderate amount of dark red blood in the vaginal vault, mild right adnexal tenderness, no left adnexal tenderness, no CMT or uterine tenderness. Os is fingertip on my evaluation (she is s/p 2 ). Blood type is Rh+, no RhoGAM is indicated.   Quantitative hCG of 18, discussed with patient could be early normal pregnancy although seems unlikely given last menstrual period reportedly 6 weeks prior. Unable to visualize IUP on transabdominal ultrasound. Concern for pregnancy of unknown location vs SAB, will obtain transvaginal pelvic ultrasound for further evaluation. Transvaginal ultrasound with complexcystic structure in the left adnexa with possible fetal pole within it concerning for possible ectopic. Discussed with Dr. Devon Malcolm with gynecology, given low quantitative hCG would have low suspicion that a fetal pole could be visualized at this stage, could also be hemorrhagic cyst.  Options would be to give methotrexate for possible ectopic although in the event that this is just a hemorrhagic cyst could terminate a normal early pregnancy. Alternative would be to very closely monitor given she has no pain/tenderness in left adnexa and Dr. Devon Malcolm can call tomorrow and schedule first thing early this week for repeat visit, bhcg level and imaging. 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 She understands strict return precautions for any worsening bleeding or pain as this could be sign of a ruptured ectopic. At this point she was discharged home in stable condition. Wet prep without yeast, clue cells or trichomonas, gonorrhea and Chlamydia are pending, she has no recent symptoms to suggest pelvic infection therefore will will defer treatment at this point following swab results. Is this patient to be included in the SEP-1 core measure due to severe sepsis or septic shock? No Exclusion criteria - the patient is NOT to be included for SEP-1 Core Measure due to: Infection is not suspected    This patient was also evaluated by the attending physician. All care plans werediscussed and agreed upon. Clinical Impression     1.  Vaginal bleeding in pregnancy        Disposition     PATIENT REFERRED TO:  No follow-up provider specified.     DISCHARGE MEDICATIONS:  Discharge Medication List as of 3/12/2023  3:12 AM          DISPOSITION Decision To Discharge 03/12/2023 03:05:51 AM      Feliz Spivey MD  03/12/23 0403

## 2023-03-12 NOTE — DISCHARGE INSTRUCTIONS
You were seen for vaginal bleeding in pregnancy. As we discussed your pregnancy hormone level was very low. This could be a very early normal pregnancy, a resolving miscarriage or a pregnancy in the wrong location such as an ectopic pregnancy. Your ultrasound was abnormal and that there was a cystic structure in your left ovary, it is unclear whether this is an ectopic pregnancy or hemorrhagic cyst.  Dr. Misty Orellana with [facility] feels it is reasonable to give it a couple days and repeat your imaging and lab results to further evaluate and the next steps for treatment at this point. He will call you tomorrow to set up an appointment in the next couple days. If you have any worsening abdominal pain or bleeding please return to the emergency department immediately, in the case this is an ectopic pregnancy this can indicate rupture which can be life-threatening.

## 2023-03-13 ENCOUNTER — CARE COORDINATION (OUTPATIENT)
Dept: OTHER | Facility: CLINIC | Age: 43
End: 2023-03-13

## 2023-03-13 NOTE — CARE COORDINATION
Ambulatory Care Coordination Note  3/13/2023    Patient Current Location:  Home: [address], [address]    ACM contacted the patient by telephone. Verified name and  with patient as identifiers. Provided introduction to self, and explanation of the ACM role. ACM: Shima Shin RN    Challenges to be reviewed by the provider   Additional needs identified to be addressed with provider: No  none               Method of communication with provider: none. Philip Zavala is a 43 y.o. female [facility] with a reported diagnosis of possible ectopic pregnancy. The patient was contacted post discharge and the answers were provided by . Patient/CG had  understanding of reason for admission. Medications were reviewed and the patient had no changes to medications. Coaching for symptoms related to disease demonstrates  and demonstrates  self-management skills. The patient was coached to monitor s/s and follow-up with OBGYn and repeat labs. The patient will be placed in remote monitoring for 30 days as part of Transitions of Care. Shima Shin RN  3/13/2023     Offered patient enrollment in the Remote Patient Monitoring (RPM) program for in-home monitoring: NA. Ambulatory Care Coordination Assessment    Care Coordination Protocol  Referral from Primary Care Provider: No  Week 1 - Initial Assessment     Do you have all of your prescriptions and are they filled?: Yes  Barriers to medication adherence: None  Are you able to afford your medications?: Yes  How often do you have trouble taking your medications the way you have been told to take them?: I always take them as prescribed. Do you have Home O2 Therapy?: No      Ability to seek help/take action for Emergent Urgent situations i.e. fire, crime, inclement weather or health crisis. : Independent  Ability to ambulate to restroom: Independent  Ability handle personal hygeine needs (bathing/dressing/grooming): Independent  Ability to manage Medications: Independent  Ability to prepare Food Preparation: Independent  Ability to maintain home (clean home, laundry): Independent  Ability to drive and/or has transportation: Independent  Ability to do shopping: Independent  Ability to manage finances: Independent  Is patient able to live independently?: Yes     Current Housing: Private Residence                 Suggested Interventions and Community Resources                  Future Appointments   Date Time Provider Ny Villarreal   3/8/2024  [facility]       Goals        Attends follow-up appointments as directed      Educate and encourage importance of FU for prevention of complications or disease;  Assess the patient's relationship with a PCP and next FU visit scheduled; Discuss importance of adherence to treatment plan and follow up visits; Identify any barriers in transportation or access to FU appointments. Assist patient with making FU appointments as needed;   It's also a good idea to know your test results. Keep a list of the medicines you take. Summary Note: Patient reports that she is doing okay. She will be having repeat lab work done today and is also calling her OBGYN today for follow-up. She denies any pain but is feeling physically tired/exhausted. She continues to have VB but this is light and has mucous in it as well. Patient is emotional and unsure of what to think or how to feel as she has no answers to what is going on yet. Patient does have a strong support system with spouse and other extended family. She denies any new red-flag s/s or needs r/t DME, medications or appointments. She is agreeable to a follow-up call with ACM the end of this week to go over labs and plan of care. She will call with needs in the meantime. ISABELLE AaronN, RN  Associate Care Manager   Cell: [phone]  [email or web address]

## 2023-03-14 LAB
C TRACH DNA GENITAL QL NAA+PROBE: NEGATIVE
N. GONORRHOEAE DNA: NEGATIVE

## 2023-03-18 ENCOUNTER — CARE COORDINATION (OUTPATIENT)
Dept: OTHER | Facility: CLINIC | Age: 43
End: 2023-03-18

## 2023-03-18 NOTE — CARE COORDINATION
Ambulatory Care Coordination Note    ACM attempted to reach patient for  Associate Care Management related to [facility] follow-up call. HIPAA compliant message left requesting a return phone call. Will attempt to outreach patient again. JON Soares, RN  Associate Care Manager   Cell: [phone]  [email or web address]

## 2023-03-25 ENCOUNTER — CARE COORDINATION (OUTPATIENT)
Dept: OTHER | Facility: CLINIC | Age: 43
End: 2023-03-25

## 2023-03-25 NOTE — CARE COORDINATION
1:30 PM Raphael Velazquez MD [facility]     Summary Note: Patient reports that she was contacted by ED provider a couple days after visit and was informed she had a miscarriage. Patient reports physically she is recovered but is working through the motions emotionally. She has a very supportive partner and family. She is awaiting a reply from [facility], her regular OBGYN for orders for repeat labs to ensure Beta hCG is back to non-pregnant value. Patient is appreciative for the follow-up and denies red-flag s/s or needs r/t DME, medications and appointments. She is agreeable to one more follow-up call in a few weeks and will call with needs in the meantime. ISABELLE GayleN, RN  Associate Care Manager   Cell: [phone]  [email or web address]

## 2023-04-24 ENCOUNTER — CARE COORDINATION (OUTPATIENT)
Dept: OTHER | Facility: CLINIC | Age: 43
End: 2023-04-24

## 2023-04-24 NOTE — CARE COORDINATION
Ambulatory Care Coordination Note    ACM attempted 2nd outreach to patient for Associate Care Management related to Rising Risk CT follow-up call. HIPAA compliant message left requesting a return phone call at patient convenience. Unable to Reach Letter sent to patient via [employer]. Will continue to outreach. Future Appointments   Date Time Provider Ny Villarreal   3/8/2024  1:30 PM MD Nelida Michaels BSN, RN  Associate Care Manager   Cell: [phone]  [email or web address]

## 2023-05-08 RX ORDER — BUSPIRONE HYDROCHLORIDE 10 MG/1
TABLET ORAL
Qty: 90 TABLET | Refills: 0 | Status: SHIPPED | OUTPATIENT
Start: 2023-05-08

## 2023-12-05 ENCOUNTER — HOSPITAL ENCOUNTER (OUTPATIENT)
Age: 43
Discharge: HOME | End: 2023-12-05
Payer: COMMERCIAL

## 2023-12-05 ENCOUNTER — HOSPITAL ENCOUNTER (OUTPATIENT)
Dept: NURSING | Age: 43
Setting detail: INFUSION SERIES
Discharge: HOME | End: 2023-12-05
Payer: COMMERCIAL

## 2023-12-05 VITALS
BODY MASS INDEX: 27.35 KG/M2 | HEIGHT: 70 IN | DIASTOLIC BLOOD PRESSURE: 76 MMHG | OXYGEN SATURATION: 99 % | SYSTOLIC BLOOD PRESSURE: 125 MMHG | HEART RATE: 67 BPM | WEIGHT: 191 LBS | TEMPERATURE: 97.6 F | RESPIRATION RATE: 16 BRPM

## 2023-12-05 LAB
ABO + RH BLD: NORMAL
ALBUMIN SERPL-MCNC: 4.4 G/DL (ref 3.4–5)
ALBUMIN/GLOB SERPL: 1.4 {RATIO} (ref 1.1–2.2)
ALP SERPL-CCNC: 48 U/L (ref 40–129)
ALT SERPL-CCNC: 20 U/L (ref 10–40)
ANION GAP SERPL CALCULATED.3IONS-SCNC: 12 MMOL/L (ref 3–16)
AST SERPL-CCNC: 20 U/L (ref 15–37)
B-HCG SERPL EIA 3RD IS-ACNC: 635.5 MIU/ML
BILIRUB SERPL-MCNC: 0.5 MG/DL (ref 0–1)
BLD GP AB SCN SERPL QL: NORMAL
BUN SERPL-MCNC: 6 MG/DL (ref 7–20)
CALCIUM SERPL-MCNC: 9.2 MG/DL (ref 8.3–10.6)
CHLORIDE SERPL-SCNC: 100 MMOL/L (ref 99–110)
CO2 SERPL-SCNC: 23 MMOL/L (ref 21–32)
CREAT SERPL-MCNC: <0.5 MG/DL (ref 0.6–1.1)
DEPRECATED RDW RBC AUTO: 12.1 % (ref 12.4–15.4)
GFR SERPLBLD CREATININE-BSD FMLA CKD-EPI: >60 ML/MIN/{1.73_M2}
GLUCOSE SERPL-MCNC: 90 MG/DL (ref 70–99)
HCT VFR BLD AUTO: 41.2 % (ref 36–48)
HGB BLD-MCNC: 13.5 G/DL (ref 12–16)
MCH RBC QN AUTO: 31.8 PG (ref 26–34)
MCHC RBC AUTO-ENTMCNC: 32.8 G/DL (ref 31–36)
MCV RBC AUTO: 97.1 FL (ref 80–100)
PLATELET # BLD AUTO: 335 K/UL (ref 135–450)
PMV BLD AUTO: 8.2 FL (ref 5–10.5)
POTASSIUM SERPL-SCNC: 3.9 MMOL/L (ref 3.5–5.1)
PROT SERPL-MCNC: 7.6 G/DL (ref 6.4–8.2)
RBC # BLD AUTO: 4.24 M/UL (ref 4–5.2)
SODIUM SERPL-SCNC: 135 MMOL/L (ref 136–145)
WBC # BLD AUTO: 5.7 K/UL (ref 4–11)

## 2023-12-05 PROCEDURE — 99211 OFF/OP EST MAY X REQ PHY/QHP: CPT

## 2023-12-05 PROCEDURE — 86900 BLOOD TYPING SEROLOGIC ABO: CPT

## 2023-12-05 PROCEDURE — 36415 COLL VENOUS BLD VENIPUNCTURE: CPT

## 2023-12-05 PROCEDURE — 96401 CHEMO ANTI-NEOPL SQ/IM: CPT

## 2023-12-05 PROCEDURE — 6360000002 HC RX W HCPCS: Performed by: OBSTETRICS & GYNECOLOGY

## 2023-12-05 PROCEDURE — 85027 COMPLETE CBC AUTOMATED: CPT

## 2023-12-05 PROCEDURE — 86901 BLOOD TYPING SEROLOGIC RH(D): CPT

## 2023-12-05 PROCEDURE — 80053 COMPREHEN METABOLIC PANEL: CPT

## 2023-12-05 PROCEDURE — 84702 CHORIONIC GONADOTROPIN TEST: CPT

## 2023-12-05 PROCEDURE — 86850 RBC ANTIBODY SCREEN: CPT

## 2023-12-05 RX ORDER — METHOTREXATE 25 MG/ML
50 INJECTION INTRA-ARTERIAL; INTRAMUSCULAR; INTRATHECAL; INTRAVENOUS ONCE
Status: COMPLETED | OUTPATIENT
Start: 2023-12-05 | End: 2023-12-05

## 2023-12-05 RX ADMIN — METHOTREXATE 103.5 MG: 25 INJECTION, SOLUTION INTRA-ARTERIAL; INTRAMUSCULAR; INTRATHECAL; INTRAVENOUS at 11:45

## 2023-12-05 ASSESSMENT — PAIN - FUNCTIONAL ASSESSMENT: PAIN_FUNCTIONAL_ASSESSMENT: 0-10

## 2023-12-05 NOTE — DISCHARGE INSTRUCTIONS
Treating an Ectopic Pregnancy With Methotrexate: Care Instructions  Overview     An ectopic pregnancy occurs when a fertilized egg grows outside of the uterus. In a normal pregnancy, the fertilized egg grows inside the uterus. In most ectopic pregnancies, the egg grows in a fallopian tube. This is also called a tubal pregnancy. Sometimes the egg grows in an ovary or some other place in the belly. But this is rare. An ectopic pregnancy never becomes a normal pregnancy and birth. You were given a medicine called methotrexate to treat your ectopic pregnancy. This was done to prevent dangerous problems. After your treatment, you may have vaginal bleeding that's like a period. It may last for about a week. You may have belly pain that lasts a few days. The pain may get worse for a day or two about a week after treatment. Treatment with methotrexate can be complex. It may involve a number of blood tests and doctor visits over the next several weeks. This treatment usually works well. But there is still a chance that you will need surgery. You should be able to have a normal pregnancy in the future. But you may have a higher risk for more ectopic pregnancies. Tell your doctor right away if you get pregnant again. Follow-up care is a key part of your treatment and safety. Be sure to make and go to all appointments, and call your doctor if you are having problems. It's also a good idea to know your test results and keep a list of the medicines you take. How can you care for yourself at home? Your doctor might want you to use sanitary pads if you have vaginal bleeding. Using pads makes it easier to keep track of your bleeding. You may use tampons during your next period. It should start in 3 to 6 weeks. Ask your doctor when you can have vaginal sex again. Get plenty of rest. You may be more tired than normal for a few weeks.   Avoid moving quickly or lifting anything heavy until your doctor tells you it is safe

## 2023-12-05 NOTE — PROGRESS NOTES
Dr Aurelio Mitchell visits answers questions and patient verbalizes concerns. Injections given per physician. Patient tolerates well.

## 2023-12-05 NOTE — PROGRESS NOTES
Discharge instructions given and reviewed. Patient and  verbalize understanding.  Vital signs stable discharged to home in stable condition

## 2023-12-09 ENCOUNTER — HOSPITAL ENCOUNTER (OUTPATIENT)
Age: 43
Discharge: HOME | End: 2023-12-09
Payer: COMMERCIAL

## 2023-12-09 LAB
ABO + RH BLD: NORMAL
ALBUMIN SERPL-MCNC: 4.4 G/DL (ref 3.4–5)
ALBUMIN/GLOB SERPL: 1.5 {RATIO} (ref 1.1–2.2)
ALP SERPL-CCNC: 44 U/L (ref 40–129)
ALT SERPL-CCNC: 14 U/L (ref 10–40)
ANION GAP SERPL CALCULATED.3IONS-SCNC: 13 MMOL/L (ref 3–16)
AST SERPL-CCNC: 14 U/L (ref 15–37)
BILIRUB SERPL-MCNC: 0.5 MG/DL (ref 0–1)
BLD GP AB SCN SERPL QL: NORMAL
BUN SERPL-MCNC: 7 MG/DL (ref 7–20)
CALCIUM SERPL-MCNC: 9.3 MG/DL (ref 8.3–10.6)
CHLORIDE SERPL-SCNC: 101 MMOL/L (ref 99–110)
CO2 SERPL-SCNC: 23 MMOL/L (ref 21–32)
CREAT SERPL-MCNC: <0.5 MG/DL (ref 0.6–1.1)
GFR SERPLBLD CREATININE-BSD FMLA CKD-EPI: >60 ML/MIN/{1.73_M2}
GLUCOSE SERPL-MCNC: 88 MG/DL (ref 70–99)
HCG SERPL QL: POSITIVE
POTASSIUM SERPL-SCNC: 3.7 MMOL/L (ref 3.5–5.1)
PROT SERPL-MCNC: 7.4 G/DL (ref 6.4–8.2)
SODIUM SERPL-SCNC: 137 MMOL/L (ref 136–145)

## 2023-12-09 PROCEDURE — 85027 COMPLETE CBC AUTOMATED: CPT

## 2023-12-09 PROCEDURE — 84702 CHORIONIC GONADOTROPIN TEST: CPT

## 2023-12-09 PROCEDURE — 86901 BLOOD TYPING SEROLOGIC RH(D): CPT

## 2023-12-09 PROCEDURE — 84703 CHORIONIC GONADOTROPIN ASSAY: CPT

## 2023-12-09 PROCEDURE — 86900 BLOOD TYPING SEROLOGIC ABO: CPT

## 2023-12-09 PROCEDURE — 80053 COMPREHEN METABOLIC PANEL: CPT

## 2023-12-09 PROCEDURE — 86850 RBC ANTIBODY SCREEN: CPT

## 2023-12-09 PROCEDURE — 36415 COLL VENOUS BLD VENIPUNCTURE: CPT

## 2023-12-10 LAB
B-HCG SERPL EIA 3RD IS-ACNC: 474 MIU/ML
DEPRECATED RDW RBC AUTO: 12.4 % (ref 12.4–15.4)
HCT VFR BLD AUTO: 41 % (ref 36–48)
HGB BLD-MCNC: 13.6 G/DL (ref 12–16)
MCH RBC QN AUTO: 32 PG (ref 26–34)
MCHC RBC AUTO-ENTMCNC: 33.1 G/DL (ref 31–36)
MCV RBC AUTO: 96.8 FL (ref 80–100)
PLATELET # BLD AUTO: 326 K/UL (ref 135–450)
PMV BLD AUTO: 9.3 FL (ref 5–10.5)
RBC # BLD AUTO: 4.24 M/UL (ref 4–5.2)
WBC # BLD AUTO: 6.8 K/UL (ref 4–11)

## 2023-12-11 ENCOUNTER — HOSPITAL ENCOUNTER (OUTPATIENT)
Age: 43
Discharge: HOME | End: 2023-12-11
Payer: COMMERCIAL

## 2023-12-11 LAB — B-HCG SERPL EIA 3RD IS-ACNC: 356 MIU/ML

## 2023-12-11 PROCEDURE — 36415 COLL VENOUS BLD VENIPUNCTURE: CPT

## 2023-12-11 PROCEDURE — 84702 CHORIONIC GONADOTROPIN TEST: CPT

## 2023-12-12 RX ORDER — DILTIAZEM HYDROCHLORIDE 120 MG/1
120 CAPSULE, COATED, EXTENDED RELEASE ORAL DAILY
Qty: 90 CAPSULE | Refills: 3 | Status: SHIPPED | OUTPATIENT
Start: 2023-12-12

## 2023-12-12 NOTE — TELEPHONE ENCOUNTER
Requested Prescriptions     Pending Prescriptions Disp Refills    dilTIAZem (CARDIZEM CD) 120 MG extended release capsule [Pharmacy Med Name: DILTIAZEM HCL ER COATED  CP24] 90 capsule 3     Sig: TAKE ONE CAPSULE BY MOUTH ONCE A DAY              Last Office Visit: 3/3/2023     Next Office Visit: 3/8/2024

## 2024-01-29 NOTE — TELEPHONE ENCOUNTER
Requested Prescriptions     Pending Prescriptions Disp Refills    metoprolol succinate (TOPROL XL) 50 MG extended release tablet [Pharmacy Med Name: METOPROLOL SUCCINATE ER 50MG TB24] 90 tablet 3     Sig: TAKE ONE TABLET BY MOUTH ONCE A DAY          Last Office Visit: 3/3/2023     Next Office Visit: 3/8/2024

## 2024-01-31 RX ORDER — METOPROLOL SUCCINATE 50 MG/1
50 TABLET, EXTENDED RELEASE ORAL DAILY
Qty: 90 TABLET | Refills: 3 | Status: SHIPPED | OUTPATIENT
Start: 2024-01-31